# Patient Record
Sex: FEMALE | Race: WHITE | NOT HISPANIC OR LATINO | Employment: FULL TIME | URBAN - METROPOLITAN AREA
[De-identification: names, ages, dates, MRNs, and addresses within clinical notes are randomized per-mention and may not be internally consistent; named-entity substitution may affect disease eponyms.]

---

## 2017-08-04 ENCOUNTER — OFFICE VISIT (OUTPATIENT)
Dept: PODIATRY | Facility: CLINIC | Age: 52
End: 2017-08-04
Payer: COMMERCIAL

## 2017-08-04 ENCOUNTER — HOSPITAL ENCOUNTER (OUTPATIENT)
Dept: RADIOLOGY | Facility: HOSPITAL | Age: 52
Discharge: HOME | End: 2017-08-04
Attending: PODIATRIST
Payer: COMMERCIAL

## 2017-08-04 VITALS — BODY MASS INDEX: 50.02 KG/M2 | HEIGHT: 64 IN | WEIGHT: 293 LBS

## 2017-08-04 DIAGNOSIS — M21.6X1 ACQUIRED EQUINUS DEFORMITY OF BOTH FEET: ICD-10-CM

## 2017-08-04 DIAGNOSIS — M79.672 CHRONIC HEEL PAIN, LEFT: ICD-10-CM

## 2017-08-04 DIAGNOSIS — G89.29 CHRONIC HEEL PAIN, LEFT: ICD-10-CM

## 2017-08-04 DIAGNOSIS — M77.9 ENTHESOPATHY: Primary | ICD-10-CM

## 2017-08-04 DIAGNOSIS — M77.9 ENTHESOPATHY: ICD-10-CM

## 2017-08-04 DIAGNOSIS — M21.6X2 ACQUIRED EQUINUS DEFORMITY OF BOTH FEET: ICD-10-CM

## 2017-08-04 PROCEDURE — 99999 PR PBB SHADOW E&M-EST. PATIENT-LVL III: CPT | Mod: PBBFAC,,, | Performed by: PODIATRIST

## 2017-08-04 PROCEDURE — 99203 OFFICE O/P NEW LOW 30 MIN: CPT | Mod: S$GLB,,, | Performed by: PODIATRIST

## 2017-08-04 PROCEDURE — 73650 X-RAY EXAM OF HEEL: CPT | Mod: TC,LT

## 2017-08-04 PROCEDURE — 73650 X-RAY EXAM OF HEEL: CPT | Mod: 26,LT,, | Performed by: RADIOLOGY

## 2017-08-04 PROCEDURE — 3008F BODY MASS INDEX DOCD: CPT | Mod: S$GLB,,, | Performed by: PODIATRIST

## 2017-08-04 RX ORDER — PANTOPRAZOLE SODIUM 40 MG/1
TABLET, DELAYED RELEASE ORAL
COMMUNITY
Start: 2017-07-14 | End: 2022-03-05

## 2017-08-04 RX ORDER — SERTRALINE HYDROCHLORIDE 50 MG/1
100 TABLET, FILM COATED ORAL DAILY
COMMUNITY

## 2017-08-04 RX ORDER — CLONAZEPAM 1 MG/1
1 TABLET ORAL 2 TIMES DAILY PRN
COMMUNITY

## 2017-08-04 RX ORDER — LIDOCAINE HYDROCHLORIDE 20 MG/ML
JELLY TOPICAL
Qty: 30 ML | Refills: 2 | Status: SHIPPED | OUTPATIENT
Start: 2017-08-04 | End: 2022-03-05

## 2017-08-04 NOTE — PROGRESS NOTES
Subjective:      Patient ID: Brittany Patricio is a 52 y.o. female.    Chief Complaint: Heel Pain (Left)    Sharp deep pain and bump back of left heel.  Gradual onset, worsening over past several weeks, aggravated by increased weight bearing, shoe gear, pressure.  No previous medical treatment.  OTC pain med not helping.  Denies trauma and surgery left foot.  Relates breaking left ankle twice before.    Review of Systems   Constitution: Negative for chills, diaphoresis, fever, malaise/fatigue and night sweats.   Cardiovascular: Negative for claudication, cyanosis, leg swelling and syncope.   Skin: Negative for color change, dry skin, nail changes, rash, suspicious lesions and unusual hair distribution.   Musculoskeletal: Negative for falls, joint pain, joint swelling, muscle cramps, muscle weakness and stiffness.   Gastrointestinal: Negative for constipation, diarrhea, nausea and vomiting.   Neurological: Negative for brief paralysis, disturbances in coordination, focal weakness, numbness, paresthesias, sensory change and tremors.           Objective:      Physical Exam   Constitutional: She appears well-developed and well-nourished. She is cooperative. No distress.   Cardiovascular:   Pulses:       Popliteal pulses are 2+ on the right side, and 2+ on the left side.        Dorsalis pedis pulses are 2+ on the right side, and 2+ on the left side.        Posterior tibial pulses are 2+ on the right side, and 2+ on the left side.   Capillary refill 3 seconds all toes/distal feet, all toes/both feet warm to touch.      Negative lymphadenopathy bilateral popliteal fossa and tarsal tunnel.      Negavie lower extremity edema bilateral.     Musculoskeletal:        Right ankle: Normal. She exhibits normal range of motion, no swelling, no ecchymosis, no deformity, no laceration and normal pulse. Achilles tendon normal. Achilles tendon exhibits no pain, no defect and normal Mendez's test results.   Pain to palpation posterior  heel left with visible and palpable hard prominence of bone.  No evidence of trauma or infection present today.    Ankle dorsiflexion decreased at <10 degrees bilateral with minimal increase with knee flexion bilateral.    Otherwise, Normal angle, base, station of gait. All ten toes without clubbing, cyanosis, or signs of ischemia.  No pain to palpation bilateral lower extremities.  Range of motion, stability, muscle strength, and muscle tone normal bilateral feet and legs.     Lymphadenopathy:   Negative lymphadenopathy bilateral popliteal fossa and tarsal tunnel.   Neurological: She is alert. She has normal strength. She displays no atrophy and no tremor. No sensory deficit. She exhibits normal muscle tone. She displays no seizure activity. Gait normal.   Reflex Scores:       Patellar reflexes are 2+ on the right side and 2+ on the left side.       Achilles reflexes are 2+ on the right side and 2+ on the left side.  Negative tinel sign to percussion sural, superficial peroneal, deep peroneal, saphenous, and posterior tibial nerves right and left ankles and feet.     Skin: Skin is warm, dry and intact. No abrasion, no bruising, no burn, no ecchymosis, no laceration, no lesion and no rash noted. She is not diaphoretic. No cyanosis or erythema. No pallor. Nails show no clubbing.     Skin is normal age and health appropriate color, turgor, texture, and temperature bilateral lower extremities without ulceration, hyperpigmentation, discoloration, masses nodules or cords palpated.  No ecchymosis, erythema, edema, or cardinal signs of infection bilateral lower extremities.               Assessment:       Encounter Diagnoses   Name Primary?    Enthesopathy Yes    Chronic heel pain, left     Acquired equinus deformity of both feet          Plan:       Brittany was seen today for heel pain.    Diagnoses and all orders for this visit:    Enthesopathy  -     Ambulatory consult to Physical Therapy  -     ORTHOTIC DEVICE  (DME)    Chronic heel pain, left  -     Ambulatory consult to Physical Therapy  -     ORTHOTIC DEVICE (DME)    Acquired equinus deformity of both feet  -     Ambulatory consult to Physical Therapy  -     ORTHOTIC DEVICE (DME)    Other orders  -     lidocaine HCL 2% (XYLOCAINE) 2 % jelly; Apply topically as needed. Apply topically once nightly to affected part left heel.      I counseled the patient on her conditions, their implications and medical management.        Patient will stretch the tendo achilles complex three times daily as demonstrated in the office.  Literature was dispensed illustrating proper stretching technique.    Patient will obtain over the counter arch supports and wear them in shoes whenever possible.  Athletic shoes intended for walking or running are usually best.    The patient was advised that NSAID-type medications have two very important potential side effects: gastrointestinal irritation including hemorrhage and renal injuries. She was asked to take the medication with food and to stop if she experiences any GI upset. I asked her to call for vomiting, abdominal pain or black/bloody stools. The patient expresses understanding of these issues and questions were answered.    Discussed conservative treatment with shoes of adequate dimensions, material, and style to alleviate symptoms and delay or prevent surgical intervention.    Rx lidocaine gel, heel lifts, PT.    Dispense fx boot left - ambulate with cane - wean to shoes with heel lifts when pain allows.              Return in about 1 month (around 9/4/2017).

## 2017-08-15 ENCOUNTER — CLINICAL SUPPORT (OUTPATIENT)
Dept: REHABILITATION | Facility: HOSPITAL | Age: 52
End: 2017-08-15
Attending: PODIATRIST
Payer: COMMERCIAL

## 2017-08-15 DIAGNOSIS — G89.29 CHRONIC PAIN OF LEFT ANKLE: Primary | ICD-10-CM

## 2017-08-15 DIAGNOSIS — R26.2 DIFFICULTY WALKING: ICD-10-CM

## 2017-08-15 DIAGNOSIS — M25.672 STIFFNESS OF LEFT ANKLE JOINT: ICD-10-CM

## 2017-08-15 DIAGNOSIS — M25.572 CHRONIC PAIN OF LEFT ANKLE: Primary | ICD-10-CM

## 2017-08-15 PROCEDURE — G8978 MOBILITY CURRENT STATUS: HCPCS | Mod: CK | Performed by: PHYSICAL THERAPIST

## 2017-08-15 PROCEDURE — G8979 MOBILITY GOAL STATUS: HCPCS | Mod: CJ | Performed by: PHYSICAL THERAPIST

## 2017-08-15 PROCEDURE — 97140 MANUAL THERAPY 1/> REGIONS: CPT | Performed by: PHYSICAL THERAPIST

## 2017-08-15 PROCEDURE — 97162 PT EVAL MOD COMPLEX 30 MIN: CPT | Performed by: PHYSICAL THERAPIST

## 2017-08-15 PROCEDURE — 97110 THERAPEUTIC EXERCISES: CPT | Performed by: PHYSICAL THERAPIST

## 2017-08-15 NOTE — PROGRESS NOTES
Name:Brittany Patricio  Physician:Orion Mcdermott DPM  Date of eval:8/15/2017  Orders:  Physical Therapy evaluate and treat  Clinic: [id number]  Diagnosis:  1. Chronic pain of left ankle     2. Stiffness of left ankle joint     3. Difficulty walking         Precautions: LBP previous R knee injury  Evaluation date: 8/15/2017  Visit # authorized: 1/83  Authorization period: 12-31-17  Plan of care expiration: 9-25-17  MD Follow up appt: 9-29-17    Subjective     Chief complaint: pain in back of L ankle    Onset of pain : after January and favoring R LE she began with increased pain in ankle   Mechanism of onset :  Having to use L LE as dominant leg now as with climbing stairs to get in house and some inside house  Pt broke her ankle in 1981 and she removed her cast and continued with some problems.  Pt broke ankle again in 2000 and was able to cast ankle again and healed ok.  Pt has had the heel spur on back of heel for several years and had pain and just lived with pain  Pt fell and fractured R patella in January 2017and had PT and still has some problems Pt also has a chronic back problem with nerve damage down R leg from LB  Aggravating factors: climbing stairs, walking and standing  Easing factors: sitting  Sleep is not disturbed.   Previous functional limitations includes:could walk, stand and climb stairs better than now  Current functional limitations: walking, standing and climbing stairs    Patients structured exercise routine: none  Exercise routine prior to onset: none    Occupation: Pt works as a   and job related duties include sitting at desk.    Allergies:    Review of patient's allergies indicates:   Allergen Reactions    Iodine and iodide containing products        Medical history: LBP with R radiculopathy,   Past Medical History:   Diagnosis Date    Hypertension        Medication:   Current Outpatient Prescriptions on File Prior to Visit   Medication Sig Dispense Refill     "benazepril-hydrochlorthiazide (LOTENSIN HCT) 10-12.5 mg Tab Take 1 tablet by mouth once daily.      buPROPion (WELLBUTRIN XL) 300 MG 24 hr tablet Take 300 mg by mouth once daily.      carisoprodol (SOMA) 350 MG tablet Take 350 mg by mouth 4 (four) times daily as needed.      cetirizine 10 mg chewable tablet Take 50 mg by mouth once daily.      clonazePAM (KLONOPIN) 1 MG tablet Take 1 mg by mouth 2 (two) times daily as needed for Anxiety.      gabapentin (NEURONTIN) 300 MG capsule Take 1 capsule (300 mg total) by mouth 3 (three) times daily. 30 capsule 0    lidocaine HCL 2% (XYLOCAINE) 2 % jelly Apply topically as needed. Apply topically once nightly to affected part left heel. 30 mL 2    meloxicam (MOBIC) 15 MG tablet Take 15 mg by mouth once daily.      pantoprazole (PROTONIX) 40 MG tablet       sertraline (ZOLOFT) 50 MG tablet Take 50 mg by mouth once daily.       No current facility-administered medications on file prior to visit.      Xray: There is DJD and spurs of the calcaneus.  No fracture dislocation bone destruction seen.    Pain level with 0 being the lowest and 10 being the highest presently: 0  Pain level with 0 being the lowest and 10 being the highest at worst: 8  Pain level with 0 being the lowest and 10 being the highest at best: 0     Patient Goals: "stretch out Achilles tendon and avoid surgery"    Objective     Postural examination in standing:  - forward head  - forward shoulders  - weight shift to R    Postural examination in sitting:   - forward head  - forward shoulders  - LE positioned relaxed to 90      Functional assessment:   - walking/gait:ambulating with cane on R in [place]  - sit to stand: significant use of hands  - sit to supine: mod difficulty       - supine to sit: mod difficulty  - supine to prone: mod difficulty     Ankle Girth:no swelling noted    Ankle ROM: (measured in degrees)   Ankle RLE LLE   Dorsiflexion with knees straight 5 -5   Dorsiflexion with knees bent 5 0 "   Plantarflexion 45 45   Inversion 30 30   Eversion 20 20   Great toe flexion 45 45   Great toe extension 60 60     Knee  ROM: (measured in degrees)   Knee (R) (L)   Flexion 110 110   Extension 0 0     Flexibility testing:  - hamstrings:     90/90 test R 60 L 35             Muscle Strength  MMT R L   Hip flexion 3+/5 4-/5   Hip abduction 3+/5 3+/5   Hip extension 3+/5 3+/5   Toe flexors 4-/5 4-/5   Toe extensors 5/5 5/5   Knee extension 4+/5 5/5   Knee flexion 4/5 5/5   Ankle dorsiflexion 5/5 5/5   Ankle plantar flexion N/T 4-/5   Ankle inversion 5/5 4+/5   Ankle eversion 5/5 5/5     Endurance is poor    Palpation: no tenderness to palpation  Severe tightness GS/soleus muscle belly Large muscular calf    Joint mobility: ankle WNL    Sensation: Intact, but reports that prolonged standing leads to B foot numbness due to back problem    Outcome measures:    Impairment function:  Mobility  FOTO ankle disability index score: 51  PT reviewed FOTO scores for Brittany Patricio on 08/15/2017.   FOTO scores were entered into [facility] - see media section.    · G-code current: CK at least 40% but < 60% impaired, limited or restricted  · G-code goal: CJ at least 20%, but < 40% impaired, limited or restricted  Severity modifier selections based on the FOTO scoring, objective findings, and co-morbidity assessment    TREATMENT:  Therapeutic exercise: Brittany received therapeutic exercises to develop strength, stabilization and endurance; flexibility and range of motion for 10 minutes including:see HEP sheet.   GSS with strap x 10  Toe crunches x3 min  Toe abd/add x 20    Manual therapy: Brittany  received the following manual therapy techniques x 15 min. to include soft tissue and joint mobilization were applied to the: gastroc soleus to include: STM to L calf and Vacuum/cupping STM with manual therapy techniques was performed to L calf to decrease muscle tightness, increase circulation and promote healing process.  The pt's skin was  monitored for redness adjusting pressure as needed. The pt was instructed in possible side effects of bruising and/or soreness.        Pt. Education: Instructed pt. regarding:proper technique with all exercises. Pt. to demonstrate good understanding of the education provided. Brittany demonstrated good return demonstration of activities. No cultural, environmental, or spiritual barriers identified to treatment or learning.    Assessment   This is a 52 y.o. female referred to outpatient physical therapy and presents with a medical diagnosis of enthesopathy, chronic heel pain L acquired equinus deformity of B feet and PT diagnosis/findings of pain in L heel with loss of flexibility and ROM demonstrating limitations as described in the problem list. Patient was in agreement with set goals and plan of care. Pt was given a written HEP along with posture education, instruction on body mechanics, activity modification/avoidance, and core/LE strengthening regimen. Pt. verbally understood instructions and demonstrated proper form/technique. Pt was advised to perform these exercises free of pain, and discontinue use if symptoms persist/worsen. Pt will benefit from physical therapy services in order to maximize pain free functional independence. Rehab potential is good  History  Co-morbidities and personal factors that may impact the plan of care Examination  Body Structures and Functions, activity limitations and participation restrictions that may impact the plan of care Clinical Presentation   Decision Making/ Complexity Score   Co-morbidities: chronic LBP and previous R knee injury                Personal Factors:   obesity Body Regions: LE's    Body Systems: musculoskeletal, ROM and strength           Activity limitations: walking standing      Participation Restrictions:  Be able to exercise for Wellness         Evolving with changing characteristics   mod  FOTO 51         Medical necessity is demonstrated by the  following IMPAIRMENTS/PROBLEM LIST:  Decreased range of motion  Decreased strength  Increased pain with walking  Antalgic gait  Increased pain with prolonged standing  Increased pain and limited with climbing stairs  ADL and household activities lead to increased pain and are limited  Functional impairment rating of: 51,CK at least 40% but < 60% impaired, limited or restricted    GOALS:   Short Term Goals:  3 weeks  Increase range of motion 25%  Increase strength 1/2 muscle grade  Be able to perform HEP with minimal cueing required  Improve functional impairment of mobility to 55    Long Term Goals: 6 weeks  Increase range of motion to 75% to 100% full   Improve muscle strength 1 muscle grade  Improve muscle strength with MMT to 4+/5 to 5/5  Restore ability to ambulate with normal pain free gait  Walking for ADL and exercise will be restored without increased pain  Restore ability to stand for ADL without increased pain  Restore ability to climb stairs in a reciprocal manner with min to 0 increased pain and/or difficulty  Restore ability to perform ADL's and household activities independently and without increased pain  Improve functional impairment of mobility to CJ at least 20%, but < 40% impaired, limited or restricted    Plan     Pt will be treated by physical therapy 1-3 times a week for 6 weeks to include: Therapeutic exercises to increase ROM, strength and stabilization; joint and soft tissue mobilization with manual therapy techniques to decrease muscle tightness, pain and improve joint mobility; neuromuscular re-education to improve balance, coordination, kinesthetic sense and proprioception, therapeutic activities to improve coordination, strength and function, therapeutic taping to decrease pain, provide support and improve function of the LE(s); modalities such as moist heat, ice, ultrasound and electrical stimulation to increase circulation, decrease pain and inflammation; dry needling with manual  therapy techniques to decrease pain, inflammation and swelling, increase circulation and promote healing process will be considered and utilized as needed; temporary orthotics will be considered and utilized as needed to further decrease pain in WB; aquatic physical therapy will be utilized as needed.  Pt may be seen by PTA to carry out plan of care as part of the Rehab team.    I certify the need for these services furnished under this plan of treatment and while under my care.    ____________________________________ Physician/Referring Practitioner                                  Date of Signature

## 2017-08-15 NOTE — PLAN OF CARE
Name:Brittany Patricio  Physician:Orion Mcdermott DPM  Date of eval:8/15/2017  Orders:  Physical Therapy evaluate and treat  Clinic: [id number]  Diagnosis:  1. Chronic pain of left ankle     2. Stiffness of left ankle joint     3. Difficulty walking         Precautions: LBP previous R knee injury  Evaluation date: 8/15/2017  Visit # authorized: 1/83  Authorization period: 12-31-17  Plan of care expiration: 9-25-17  MD Follow up appt: 9-29-17    Subjective     Chief complaint: pain in back of L ankle    Onset of pain : after January and favoring R LE she began with increased pain in ankle   Mechanism of onset :  Having to use L LE as dominant leg now as with climbing stairs to get in house and some inside house  Pt broke her ankle in 1981 and she removed her cast and continued with some problems.  Pt broke ankle again in 2000 and was able to cast ankle again and healed ok.  Pt has had the heel spur on back of heel for several years and had pain and just lived with pain  Pt fell and fractured R patella in January 2017and had PT and still has some problems Pt also has a chronic back problem with nerve damage down R leg from LB  Aggravating factors: climbing stairs, walking and standing  Easing factors: sitting  Sleep is not disturbed.   Previous functional limitations includes:could walk, stand and climb stairs better than now  Current functional limitations: walking, standing and climbing stairs    Patients structured exercise routine: none  Exercise routine prior to onset: none    Occupation: Pt works as a   and job related duties include sitting at desk.    Allergies:    Review of patient's allergies indicates:   Allergen Reactions    Iodine and iodide containing products        Medical history: LBP with R radiculopathy,   Past Medical History:   Diagnosis Date    Hypertension        Medication:   Current Outpatient Prescriptions on File Prior to Visit   Medication Sig Dispense Refill     "benazepril-hydrochlorthiazide (LOTENSIN HCT) 10-12.5 mg Tab Take 1 tablet by mouth once daily.      buPROPion (WELLBUTRIN XL) 300 MG 24 hr tablet Take 300 mg by mouth once daily.      carisoprodol (SOMA) 350 MG tablet Take 350 mg by mouth 4 (four) times daily as needed.      cetirizine 10 mg chewable tablet Take 50 mg by mouth once daily.      clonazePAM (KLONOPIN) 1 MG tablet Take 1 mg by mouth 2 (two) times daily as needed for Anxiety.      gabapentin (NEURONTIN) 300 MG capsule Take 1 capsule (300 mg total) by mouth 3 (three) times daily. 30 capsule 0    lidocaine HCL 2% (XYLOCAINE) 2 % jelly Apply topically as needed. Apply topically once nightly to affected part left heel. 30 mL 2    meloxicam (MOBIC) 15 MG tablet Take 15 mg by mouth once daily.      pantoprazole (PROTONIX) 40 MG tablet       sertraline (ZOLOFT) 50 MG tablet Take 50 mg by mouth once daily.       No current facility-administered medications on file prior to visit.      Xray: There is DJD and spurs of the calcaneus.  No fracture dislocation bone destruction seen.    Pain level with 0 being the lowest and 10 being the highest presently: 0  Pain level with 0 being the lowest and 10 being the highest at worst: 8  Pain level with 0 being the lowest and 10 being the highest at best: 0     Patient Goals: "stretch out Achilles tendon and avoid surgery"    Objective     Postural examination in standing:  - forward head  - forward shoulders  - weight shift to R    Postural examination in sitting:   - forward head  - forward shoulders  - LE positioned relaxed to 90      Functional assessment:   - walking/gait:ambulating with cane on R in [place]  - sit to stand: significant use of hands  - sit to supine: mod difficulty       - supine to sit: mod difficulty  - supine to prone: mod difficulty     Ankle Girth:no swelling noted    Ankle ROM: (measured in degrees)   Ankle RLE LLE   Dorsiflexion with knees straight 5 -5   Dorsiflexion with knees bent 5 0 "   Plantarflexion 45 45   Inversion 30 30   Eversion 20 20   Great toe flexion 45 45   Great toe extension 60 60     Knee  ROM: (measured in degrees)   Knee (R) (L)   Flexion 110 110   Extension 0 0     Flexibility testing:  - hamstrings:     90/90 test R 60 L 35             Muscle Strength  MMT R L   Hip flexion 3+/5 4-/5   Hip abduction 3+/5 3+/5   Hip extension 3+/5 3+/5   Toe flexors 4-/5 4-/5   Toe extensors 5/5 5/5   Knee extension 4+/5 5/5   Knee flexion 4/5 5/5   Ankle dorsiflexion 5/5 5/5   Ankle plantar flexion N/T 4-/5   Ankle inversion 5/5 4+/5   Ankle eversion 5/5 5/5     Endurance is poor    Palpation: no tenderness to palpation  Severe tightness GS/soleus muscle belly Large muscular calf    Joint mobility: ankle WNL    Sensation: Intact, but reports that prolonged standing leads to B foot numbness due to back problem    Outcome measures:    Impairment function:  Mobility  FOTO ankle disability index score: 51  PT reviewed FOTO scores for Brittany Patricio on 08/15/2017.   FOTO scores were entered into [employer] - see media section.    · G-code current: CK at least 40% but < 60% impaired, limited or restricted  · G-code goal: CJ at least 20%, but < 40% impaired, limited or restricted  Severity modifier selections based on the FOTO scoring, objective findings, and co-morbidity assessment    TREATMENT:  Therapeutic exercise: Brittany received therapeutic exercises to develop strength, stabilization and endurance; flexibility and range of motion for 10 minutes including:see HEP sheet.   GSS with strap x 10  Toe crunches x3 min  Toe abd/add x 20    Manual therapy: Brittany  received the following manual therapy techniques x 15 min. to include soft tissue and joint mobilization were applied to the: gastroc soleus to include: STM to L calf and Vacuum/cupping STM with manual therapy techniques was performed to L calf to decrease muscle tightness, increase circulation and promote healing process.  The pt's skin was  monitored for redness adjusting pressure as needed. The pt was instructed in possible side effects of bruising and/or soreness.        Pt. Education: Instructed pt. regarding:proper technique with all exercises. Pt. to demonstrate good understanding of the education provided. Brittany demonstrated good return demonstration of activities. No cultural, environmental, or spiritual barriers identified to treatment or learning.    Assessment   This is a 52 y.o. female referred to outpatient physical therapy and presents with a medical diagnosis of enthesopathy, chronic heel pain L acquired equinus deformity of B feet and PT diagnosis/findings of pain in L heel with loss of flexibility and ROM demonstrating limitations as described in the problem list. Patient was in agreement with set goals and plan of care. Pt was given a written HEP along with posture education, instruction on body mechanics, activity modification/avoidance, and core/LE strengthening regimen. Pt. verbally understood instructions and demonstrated proper form/technique. Pt was advised to perform these exercises free of pain, and discontinue use if symptoms persist/worsen. Pt will benefit from physical therapy services in order to maximize pain free functional independence. Rehab potential is good  History  Co-morbidities and personal factors that may impact the plan of care Examination  Body Structures and Functions, activity limitations and participation restrictions that may impact the plan of care Clinical Presentation   Decision Making/ Complexity Score   Co-morbidities: chronic LBP and previous R knee injury                Personal Factors:   obesity Body Regions: LE's    Body Systems: musculoskeletal, ROM and strength           Activity limitations: walking standing      Participation Restrictions:  Be able to exercise for Wellness         Evolving with changing characteristics   mod  FOTO 51         Medical necessity is demonstrated by the  following IMPAIRMENTS/PROBLEM LIST:  Decreased range of motion  Decreased strength  Increased pain with walking  Antalgic gait  Increased pain with prolonged standing  Increased pain and limited with climbing stairs  ADL and household activities lead to increased pain and are limited  Functional impairment rating of: 51,CK at least 40% but < 60% impaired, limited or restricted    GOALS:   Short Term Goals:  3 weeks  Increase range of motion 25%  Increase strength 1/2 muscle grade  Be able to perform HEP with minimal cueing required  Improve functional impairment of mobility to 55    Long Term Goals: 6 weeks  Increase range of motion to 75% to 100% full   Improve muscle strength 1 muscle grade  Improve muscle strength with MMT to 4+/5 to 5/5  Restore ability to ambulate with normal pain free gait  Walking for ADL and exercise will be restored without increased pain  Restore ability to stand for ADL without increased pain  Restore ability to climb stairs in a reciprocal manner with min to 0 increased pain and/or difficulty  Restore ability to perform ADL's and household activities independently and without increased pain  Improve functional impairment of mobility to CJ at least 20%, but < 40% impaired, limited or restricted    Plan     Pt will be treated by physical therapy 1-3 times a week for 6 weeks to include: Therapeutic exercises to increase ROM, strength and stabilization; joint and soft tissue mobilization with manual therapy techniques to decrease muscle tightness, pain and improve joint mobility; neuromuscular re-education to improve balance, coordination, kinesthetic sense and proprioception, therapeutic activities to improve coordination, strength and function, therapeutic taping to decrease pain, provide support and improve function of the LE(s); modalities such as moist heat, ice, ultrasound and electrical stimulation to increase circulation, decrease pain and inflammation; dry needling with manual  therapy techniques to decrease pain, inflammation and swelling, increase circulation and promote healing process will be considered and utilized as needed; temporary orthotics will be considered and utilized as needed to further decrease pain in WB; aquatic physical therapy will be utilized as needed.  Pt may be seen by PTA to carry out plan of care as part of the Rehab team.    I certify the need for these services furnished under this plan of treatment and while under my care.    ____________________________________ Physician/Referring Practitioner                                  Date of Signature

## 2017-08-21 ENCOUNTER — CLINICAL SUPPORT (OUTPATIENT)
Dept: REHABILITATION | Facility: HOSPITAL | Age: 52
End: 2017-08-21
Attending: PODIATRIST
Payer: COMMERCIAL

## 2017-08-21 DIAGNOSIS — R26.2 DIFFICULTY WALKING: ICD-10-CM

## 2017-08-21 DIAGNOSIS — G89.29 CHRONIC PAIN OF LEFT ANKLE: Primary | ICD-10-CM

## 2017-08-21 DIAGNOSIS — M25.572 CHRONIC PAIN OF LEFT ANKLE: Primary | ICD-10-CM

## 2017-08-21 DIAGNOSIS — M25.672 STIFFNESS OF LEFT ANKLE JOINT: ICD-10-CM

## 2017-08-21 PROCEDURE — 97110 THERAPEUTIC EXERCISES: CPT

## 2017-08-21 PROCEDURE — 97140 MANUAL THERAPY 1/> REGIONS: CPT

## 2017-08-21 NOTE — PROGRESS NOTES
Name:Brittany Patricio  Physician:Orion Mcdermott DPM  Date of eval:8/21/2017  Orders:  Physical Therapy evaluate and treat  Clinic: [id number]  Diagnosis:  No diagnosis found.    Precautions: LBP previous R knee injury  Evaluation date: 8/21/2017  Visit # authorized: 2/83  Authorization period: 12-31-17  Plan of care expiration: 9-25-17  MD Follow up appt: 9-29-17    Subjective   Pt reports w/ 6/10 L ankle pn.     Allergies:    Review of patient's allergies indicates:   Allergen Reactions    Iodine and iodide containing products        Medical history: LBP with R radiculopathy,   Past Medical History:   Diagnosis Date    Hypertension        Medication:   Current Outpatient Prescriptions on File Prior to Visit   Medication Sig Dispense Refill    benazepril-hydrochlorthiazide (LOTENSIN HCT) 10-12.5 mg Tab Take 1 tablet by mouth once daily.      buPROPion (WELLBUTRIN XL) 300 MG 24 hr tablet Take 300 mg by mouth once daily.      carisoprodol (SOMA) 350 MG tablet Take 350 mg by mouth 4 (four) times daily as needed.      cetirizine 10 mg chewable tablet Take 50 mg by mouth once daily.      clonazePAM (KLONOPIN) 1 MG tablet Take 1 mg by mouth 2 (two) times daily as needed for Anxiety.      gabapentin (NEURONTIN) 300 MG capsule Take 1 capsule (300 mg total) by mouth 3 (three) times daily. 30 capsule 0    lidocaine HCL 2% (XYLOCAINE) 2 % jelly Apply topically as needed. Apply topically once nightly to affected part left heel. 30 mL 2    meloxicam (MOBIC) 15 MG tablet Take 15 mg by mouth once daily.      pantoprazole (PROTONIX) 40 MG tablet       sertraline (ZOLOFT) 50 MG tablet Take 50 mg by mouth once daily.       No current facility-administered medications on file prior to visit.      Xray: There is DJD and spurs of the calcaneus.  No fracture dislocation bone destruction seen.    Pain level with 0 being the lowest and 10 being the highest presently: 0  Pain level with 0 being the lowest and 10 being the highest  "at worst: 8  Pain level with 0 being the lowest and 10 being the highest at best: 0     Patient Goals: "stretch out Achilles tendon and avoid surgery"    Objective     Postural examination in standing:  - forward head  - forward shoulders  - weight shift to R    Postural examination in sitting:   - forward head  - forward shoulders  - LE positioned relaxed to 90      Outcome measures:    Impairment function:  Mobility  FOTO ankle disability index score: 51  PT reviewed FOTO scores for Brittany Patricio on 08/21/2017.   FOTO scores were entered into [employer] - see media section.    · G-code current: CK at least 40% but < 60% impaired, limited or restricted  · G-code goal: CJ at least 20%, but < 40% impaired, limited or restricted  Severity modifier selections based on the FOTO scoring, objective findings, and co-morbidity assessment    TREATMENT:  Therapeutic exercise: Brittany received therapeutic exercises to develop strength, stabilization and endurance; flexibility and range of motion for 50 minutes including:see HEP sheet.     GSS with strap x 10  Toe crunches 30 x 3 sec hold  Towel sweeps 2 min  [place]  x 2   Toe abd/add 30 x 3 sec hold  Ankle 4 way AROM 30 x ea      Manual therapy: Brittany  received the following manual therapy techniques x 8 min. to include soft tissue and joint mobilization were applied to the: gastroc soleus to include: STM to L calf and Vacuum/cupping STM with manual therapy techniques was performed to L calf to decrease muscle tightness, increase circulation and promote healing process.  The pt's skin was monitored for redness adjusting pressure as needed. The pt was instructed in possible side effects of bruising and/or soreness.        Pt. Education: Instructed pt. regarding:proper technique with all exercises. Pt. to demonstrate good understanding of the education provided. Brittany demonstrated good return demonstration of activities. No cultural, environmental, or spiritual barriers " identified to treatment or learning.    Assessment   Pt burak tx well w/ no increase in pn.  Pt displayed increased endurance during therex w/ VCs for technique.  Pt edu on and instructed to cont HEP.  Cont to progress as burak.    Pt will benefit from physical therapy services in order to maximize pain free functional independence. Rehab potential is good  History  Co-morbidities and personal factors that may impact the plan of care Examination  Body Structures and Functions, activity limitations and participation restrictions that may impact the plan of care Clinical Presentation   Decision Making/ Complexity Score   Co-morbidities: chronic LBP and previous R knee injury                Personal Factors:   obesity Body Regions: LE's    Body Systems: musculoskeletal, ROM and strength           Activity limitations: walking standing      Participation Restrictions:  Be able to exercise for Wellness         Evolving with changing characteristics   mod  FOTO 51         Medical necessity is demonstrated by the following IMPAIRMENTS/PROBLEM LIST:  Decreased range of motion  Decreased strength  Increased pain with walking  Antalgic gait  Increased pain with prolonged standing  Increased pain and limited with climbing stairs  ADL and household activities lead to increased pain and are limited  Functional impairment rating of: 51,CK at least 40% but < 60% impaired, limited or restricted    GOALS:   Short Term Goals:  3 weeks  Increase range of motion 25%  Increase strength 1/2 muscle grade  Be able to perform HEP with minimal cueing required  Improve functional impairment of mobility to 55    Long Term Goals: 6 weeks  Increase range of motion to 75% to 100% full   Improve muscle strength 1 muscle grade  Improve muscle strength with MMT to 4+/5 to 5/5  Restore ability to ambulate with normal pain free gait  Walking for ADL and exercise will be restored without increased pain  Restore ability to stand for ADL without increased  pain  Restore ability to climb stairs in a reciprocal manner with min to 0 increased pain and/or difficulty  Restore ability to perform ADL's and household activities independently and without increased pain  Improve functional impairment of mobility to CJ at least 20%, but < 40% impaired, limited or restricted    Plan     Pt will be treated by physical therapy 1-3 times a week for 6 weeks to include: Therapeutic exercises to increase ROM, strength and stabilization; joint and soft tissue mobilization with manual therapy techniques to decrease muscle tightness, pain and improve joint mobility; neuromuscular re-education to improve balance, coordination, kinesthetic sense and proprioception, therapeutic activities to improve coordination, strength and function, therapeutic taping to decrease pain, provide support and improve function of the LE(s); modalities such as moist heat, ice, ultrasound and electrical stimulation to increase circulation, decrease pain and inflammation; dry needling with manual therapy techniques to decrease pain, inflammation and swelling, increase circulation and promote healing process will be considered and utilized as needed; temporary orthotics will be considered and utilized as needed to further decrease pain in WB; aquatic physical therapy will be utilized as needed.  Pt may be seen by PTA to carry out plan of care as part of the Rehab team.

## 2017-08-23 ENCOUNTER — CLINICAL SUPPORT (OUTPATIENT)
Dept: REHABILITATION | Facility: HOSPITAL | Age: 52
End: 2017-08-23
Attending: PODIATRIST
Payer: COMMERCIAL

## 2017-08-23 DIAGNOSIS — G89.29 CHRONIC PAIN OF LEFT ANKLE: Primary | ICD-10-CM

## 2017-08-23 DIAGNOSIS — M25.572 CHRONIC PAIN OF LEFT ANKLE: Primary | ICD-10-CM

## 2017-08-23 PROCEDURE — 97110 THERAPEUTIC EXERCISES: CPT

## 2017-08-23 PROCEDURE — 97140 MANUAL THERAPY 1/> REGIONS: CPT

## 2017-08-23 NOTE — PROGRESS NOTES
Name:Brittany Patricio  Physician:Orion Mcdermott DPM  Date of eval:8/23/2017  Orders:  Physical Therapy evaluate and treat  Clinic: [id number]  Diagnosis:  1. Chronic pain of left ankle         Precautions: LBP previous R knee injury  Evaluation date: 8/23/2017  Visit # authorized: 3/83  Authorization period: 12-31-17  Plan of care expiration: 9-25-17  MD Follow up appt: 9-29-17    Subjective   Pt denies pn upon arrival.     Allergies:    Review of patient's allergies indicates:   Allergen Reactions    Iodine and iodide containing products        Medical history: LBP with R radiculopathy,   Past Medical History:   Diagnosis Date    Hypertension        Medication:   Current Outpatient Prescriptions on File Prior to Visit   Medication Sig Dispense Refill    benazepril-hydrochlorthiazide (LOTENSIN HCT) 10-12.5 mg Tab Take 1 tablet by mouth once daily.      buPROPion (WELLBUTRIN XL) 300 MG 24 hr tablet Take 300 mg by mouth once daily.      carisoprodol (SOMA) 350 MG tablet Take 350 mg by mouth 4 (four) times daily as needed.      cetirizine 10 mg chewable tablet Take 50 mg by mouth once daily.      clonazePAM (KLONOPIN) 1 MG tablet Take 1 mg by mouth 2 (two) times daily as needed for Anxiety.      gabapentin (NEURONTIN) 300 MG capsule Take 1 capsule (300 mg total) by mouth 3 (three) times daily. 30 capsule 0    lidocaine HCL 2% (XYLOCAINE) 2 % jelly Apply topically as needed. Apply topically once nightly to affected part left heel. 30 mL 2    meloxicam (MOBIC) 15 MG tablet Take 15 mg by mouth once daily.      pantoprazole (PROTONIX) 40 MG tablet       sertraline (ZOLOFT) 50 MG tablet Take 50 mg by mouth once daily.       No current facility-administered medications on file prior to visit.      Xray: There is DJD and spurs of the calcaneus.  No fracture dislocation bone destruction seen.    Pain level with 0 being the lowest and 10 being the highest presently: 0  Pain level with 0 being the lowest and 10 being  "the highest at worst: 8  Pain level with 0 being the lowest and 10 being the highest at best: 0     Patient Goals: "stretch out Achilles tendon and avoid surgery"    Objective    Pt arrived wearing boot on L foot  Postural examination in standing:  - forward head  - forward shoulders  - weight shift to R    Postural examination in sitting:   - forward head  - forward shoulders  - LE positioned relaxed to 90      Outcome measures:    Impairment function:  Mobility  FOTO ankle disability index score: 51  PT reviewed FOTO scores for Brittany Patricio on 08/23/2017.   FOTO scores were entered into [employer] - see media section.    · G-code current: CK at least 40% but < 60% impaired, limited or restricted  · G-code goal: CJ at least 20%, but < 40% impaired, limited or restricted  Severity modifier selections based on the FOTO scoring, objective findings, and co-morbidity assessment    TREATMENT:  Therapeutic exercise: Brittany  was instructed in and performed  therapeutic exercises to develop strength, stabilization and endurance; flexibility and range of motion for 50 minutes including:  stat bike x 10 min.   Soleus stretch incline 2 min  GSS incline 2 min  Toe crunches 30 x 3 sec hold  Towel sweeps IN/EV 2 min ea.  [place]  x 2   Toe abd/add 30 x 3 sec hold  Ankle 4 way AROM 30 x ea YTB      Manual therapy: Brittany  received the following manual therapy techniques x 8 min. to include soft tissue and joint mobilization were applied to the: gastroc soleus to include: STM to L calf and Vacuum/cupping STM with manual therapy techniques was performed to L calf to decrease muscle tightness, increase circulation and promote healing process.  The pt's skin was monitored for redness adjusting pressure as needed. The pt was instructed in possible side effects of bruising and/or soreness.        Pt. Education: Instructed pt. regarding:proper technique with all exercises. Pt. to demonstrate good understanding of the education " leila Brittany demonstrated good return demonstration of activities. No cultural, environmental, or spiritual barriers identified to treatment or learning.    Assessment   Pt burak tx  with progression of ther ex well w/ no increase in pn.  Pt displayed increased endurance during therex w/ VCs for technique.  Pt edu on and instructed to cont HEP.  Cont to progress as burak.    Pt will benefit from physical therapy services in order to maximize pain free functional independence. Rehab potential is good  History  Co-morbidities and personal factors that may impact the plan of care Examination  Body Structures and Functions, activity limitations and participation restrictions that may impact the plan of care Clinical Presentation   Decision Making/ Complexity Score   Co-morbidities: chronic LBP and previous R knee injury                Personal Factors:   obesity Body Regions: LE's    Body Systems: musculoskeletal, ROM and strength           Activity limitations: walking standing      Participation Restrictions:  Be able to exercise for Wellness         Evolving with changing characteristics   mod  FOTO 51         Medical necessity is demonstrated by the following IMPAIRMENTS/PROBLEM LIST:  Decreased range of motion  Decreased strength  Increased pain with walking  Antalgic gait  Increased pain with prolonged standing  Increased pain and limited with climbing stairs  ADL and household activities lead to increased pain and are limited  Functional impairment rating of: 51,CK at least 40% but < 60% impaired, limited or restricted    GOALS:   Short Term Goals:  3 weeks  Increase range of motion 25%  Increase strength 1/2 muscle grade  Be able to perform HEP with minimal cueing required  Improve functional impairment of mobility to 55    Long Term Goals: 6 weeks  Increase range of motion to 75% to 100% full   Improve muscle strength 1 muscle grade  Improve muscle strength with MMT to 4+/5 to 5/5  Restore ability to ambulate  with normal pain free gait  Walking for ADL and exercise will be restored without increased pain  Restore ability to stand for ADL without increased pain  Restore ability to climb stairs in a reciprocal manner with min to 0 increased pain and/or difficulty  Restore ability to perform ADL's and household activities independently and without increased pain  Improve functional impairment of mobility to CJ at least 20%, but < 40% impaired, limited or restricted    Plan     Pt will be treated by physical therapy 1-3 times a week for 6 weeks to include: Therapeutic exercises to increase ROM, strength and stabilization; joint and soft tissue mobilization with manual therapy techniques to decrease muscle tightness, pain and improve joint mobility; neuromuscular re-education to improve balance, coordination, kinesthetic sense and proprioception, therapeutic activities to improve coordination, strength and function, therapeutic taping to decrease pain, provide support and improve function of the LE(s); modalities such as moist heat, ice, ultrasound and electrical stimulation to increase circulation, decrease pain and inflammation; dry needling with manual therapy techniques to decrease pain, inflammation and swelling, increase circulation and promote healing process will be considered and utilized as needed; temporary orthotics will be considered and utilized as needed to further decrease pain in WB; aquatic physical therapy will be utilized as needed.  Pt may be seen by PTA to carry out plan of care as part of the Rehab team.

## 2017-08-31 ENCOUNTER — CLINICAL SUPPORT (OUTPATIENT)
Dept: REHABILITATION | Facility: HOSPITAL | Age: 52
End: 2017-08-31
Attending: PODIATRIST
Payer: COMMERCIAL

## 2017-08-31 DIAGNOSIS — R26.2 DIFFICULTY WALKING: ICD-10-CM

## 2017-08-31 DIAGNOSIS — G89.29 CHRONIC PAIN OF LEFT ANKLE: Primary | ICD-10-CM

## 2017-08-31 DIAGNOSIS — M25.672 STIFFNESS OF LEFT ANKLE JOINT: ICD-10-CM

## 2017-08-31 DIAGNOSIS — M25.572 CHRONIC PAIN OF LEFT ANKLE: Primary | ICD-10-CM

## 2017-08-31 PROCEDURE — 97110 THERAPEUTIC EXERCISES: CPT | Performed by: PHYSICAL THERAPIST

## 2017-08-31 PROCEDURE — 97140 MANUAL THERAPY 1/> REGIONS: CPT | Performed by: PHYSICAL THERAPIST

## 2017-08-31 NOTE — PROGRESS NOTES
Name:Brittany Patricio  Physician:Orion Mcdermott DPM  Date of eval:8/31/2017  Orders:  Physical Therapy evaluate and treat  Clinic: [id number]  Diagnosis:  1. Chronic pain of left ankle     2. Stiffness of left ankle joint     3. Difficulty walking         Precautions: LBP previous R knee injury  Evaluation date: 8/31/2017  Visit # authorized: 4/83  Authorization period: 12-31-17  Plan of care expiration: 9-25-17  MD Follow up appt: 9-29-17    Subjective   Pt states still hurts Pain level 2-3     Allergies:    Review of patient's allergies indicates:   Allergen Reactions    Iodine and iodide containing products        Medical history: LBP with R radiculopathy,   Past Medical History:   Diagnosis Date    Hypertension        Medication:   Current Outpatient Prescriptions on File Prior to Visit   Medication Sig Dispense Refill    benazepril-hydrochlorthiazide (LOTENSIN HCT) 10-12.5 mg Tab Take 1 tablet by mouth once daily.      buPROPion (WELLBUTRIN XL) 300 MG 24 hr tablet Take 300 mg by mouth once daily.      carisoprodol (SOMA) 350 MG tablet Take 350 mg by mouth 4 (four) times daily as needed.      cetirizine 10 mg chewable tablet Take 50 mg by mouth once daily.      clonazePAM (KLONOPIN) 1 MG tablet Take 1 mg by mouth 2 (two) times daily as needed for Anxiety.      gabapentin (NEURONTIN) 300 MG capsule Take 1 capsule (300 mg total) by mouth 3 (three) times daily. 30 capsule 0    lidocaine HCL 2% (XYLOCAINE) 2 % jelly Apply topically as needed. Apply topically once nightly to affected part left heel. 30 mL 2    meloxicam (MOBIC) 15 MG tablet Take 15 mg by mouth once daily.      pantoprazole (PROTONIX) 40 MG tablet       sertraline (ZOLOFT) 50 MG tablet Take 50 mg by mouth once daily.       No current facility-administered medications on file prior to visit.      Xray: There is DJD and spurs of the calcaneus.  No fracture dislocation bone destruction seen.    Pain level with 0 being the lowest and 10 being the  "highest presently: 0  Pain level with 0 being the lowest and 10 being the highest at worst: 8  Pain level with 0 being the lowest and 10 being the highest at best: 0     Patient Goals: "stretch out Achilles tendon and avoid surgery"    Objective    Pt arrived wearing boot on L foot    DF with knee extended and flexed prior to treatment 0    TREATMENT:  Therapeutic exercise: Brittany  was instructed in and performed  therapeutic exercises to develop strength, stabilization and endurance; flexibility and range of motion for 30 minutes including:  stat bike x 10 min.    Soleus stretch with strap x 10  GSS with strap x 10  Toe crunches 3 min  Towel sweeps IN/EV 2 min ea.  [place]  x 2   Toe abd/add 30 x 3 sec hold   Heel raises sitting  x 20   Baps board x 10 all planes   Ankle 4 way AROM 10 x ea OTB  Provided OTB for home use    Written ex provided for home use indented ex noted above      Manual therapy: Brittany  received the following manual therapy techniques x 25 min. to include soft tissue and joint mobilization were applied to the: gastroc soleus to include: STM to L calf and Pt received tool-assisted massage with manual therapy techniques to L gastroc soleus to trigger an inflammatory healing response and stimulate the production of new collagen and proper, more functional, less painful healing.   Vacuum/cupping STM with manual therapy techniques was performed to L calf to decrease muscle tightness, increase circulation and promote healing process.  The pt's skin was monitored for redness adjusting pressure as needed. The pt was instructed in possible side effects of bruising and/or soreness.    Kinesiotape was performed with 16" I strip to the Achilles region with 8" of I strip a Y to gastroc for pain relief and support.  Instructed pt in use, care and precautions with tape.      Pt refused ice stating she would ice at home as needed      Pt. Education: Instructed pt. regarding:proper technique with all " exercises. Pt. to demonstrate good understanding of the education provided. Brittany demonstrated good return demonstration of activities. No cultural, environmental, or spiritual barriers identified to treatment or learning.    Assessment   Pt with improved STMobility after treatment.  Alyssa progression well    Pt will benefit from physical therapy services in order to maximize pain free functional independence. Rehab potential is good  History  Co-morbidities and personal factors that may impact the plan of care Examination  Body Structures and Functions, activity limitations and participation restrictions that may impact the plan of care Clinical Presentation   Decision Making/ Complexity Score   Co-morbidities: chronic LBP and previous R knee injury                Personal Factors:   obesity Body Regions: LE's    Body Systems: musculoskeletal, ROM and strength           Activity limitations: walking standing      Participation Restrictions:  Be able to exercise for Wellness         Evolving with changing characteristics   mod  FOTO 51         Medical necessity is demonstrated by the following IMPAIRMENTS/PROBLEM LIST:  Decreased range of motion  Decreased strength  Increased pain with walking  Antalgic gait  Increased pain with prolonged standing  Increased pain and limited with climbing stairs  ADL and household activities lead to increased pain and are limited  Functional impairment rating of: 51,CK at least 40% but < 60% impaired, limited or restricted    GOALS:   Short Term Goals:  3 weeks  Increase range of motion 25%  Increase strength 1/2 muscle grade  Be able to perform HEP with minimal cueing required  Improve functional impairment of mobility to 55    Long Term Goals: 6 weeks  Increase range of motion to 75% to 100% full   Improve muscle strength 1 muscle grade  Improve muscle strength with MMT to 4+/5 to 5/5  Restore ability to ambulate with normal pain free gait  Walking for ADL and exercise will be  restored without increased pain  Restore ability to stand for ADL without increased pain  Restore ability to climb stairs in a reciprocal manner with min to 0 increased pain and/or difficulty  Restore ability to perform ADL's and household activities independently and without increased pain  Improve functional impairment of mobility to CJ at least 20%, but < 40% impaired, limited or restricted    Plan     Pt will be treated by physical therapy 1-3 times a week for 6 weeks to include: Therapeutic exercises to increase ROM, strength and stabilization; joint and soft tissue mobilization with manual therapy techniques to decrease muscle tightness, pain and improve joint mobility; neuromuscular re-education to improve balance, coordination, kinesthetic sense and proprioception, therapeutic activities to improve coordination, strength and function, therapeutic taping to decrease pain, provide support and improve function of the LE(s); modalities such as moist heat, ice, ultrasound and electrical stimulation to increase circulation, decrease pain and inflammation; dry needling with manual therapy techniques to decrease pain, inflammation and swelling, increase circulation and promote healing process will be considered and utilized as needed; temporary orthotics will be considered and utilized as needed to further decrease pain in WB; aquatic physical therapy will be utilized as needed.  Pt may be seen by PTA to carry out plan of care as part of the Rehab team.

## 2017-09-07 ENCOUNTER — CLINICAL SUPPORT (OUTPATIENT)
Dept: REHABILITATION | Facility: HOSPITAL | Age: 52
End: 2017-09-07
Attending: PODIATRIST
Payer: COMMERCIAL

## 2017-09-07 DIAGNOSIS — G89.29 CHRONIC PAIN OF LEFT ANKLE: Primary | ICD-10-CM

## 2017-09-07 DIAGNOSIS — M25.572 CHRONIC PAIN OF LEFT ANKLE: Primary | ICD-10-CM

## 2017-09-07 DIAGNOSIS — M25.672 STIFFNESS OF LEFT ANKLE JOINT: ICD-10-CM

## 2017-09-07 PROCEDURE — 97140 MANUAL THERAPY 1/> REGIONS: CPT

## 2017-09-07 PROCEDURE — 97110 THERAPEUTIC EXERCISES: CPT

## 2017-09-07 NOTE — PROGRESS NOTES
Name:Brittany Patricio  Physician:Orion Mcdermott DPM  Date of eval:9/7/2017  Orders:  Physical Therapy evaluate and treat  Clinic: [id number]  Diagnosis:  1. Chronic pain of left ankle     2. Stiffness of left ankle joint         Precautions: LBP previous R knee injury  Evaluation date: 9/7/2017  Visit # authorized: 5/83  Authorization period: 12-31-17  Plan of care expiration: 9-25-17  MD Follow up appt: 9-29-17    Subjective   Pt reports w/ 3/10 pn in L ankle.      Allergies:    Review of patient's allergies indicates:   Allergen Reactions    Iodine and iodide containing products        Medical history: LBP with R radiculopathy,   Past Medical History:   Diagnosis Date    Hypertension        Medication:   Current Outpatient Prescriptions on File Prior to Visit   Medication Sig Dispense Refill    benazepril-hydrochlorthiazide (LOTENSIN HCT) 10-12.5 mg Tab Take 1 tablet by mouth once daily.      buPROPion (WELLBUTRIN XL) 300 MG 24 hr tablet Take 300 mg by mouth once daily.      carisoprodol (SOMA) 350 MG tablet Take 350 mg by mouth 4 (four) times daily as needed.      cetirizine 10 mg chewable tablet Take 50 mg by mouth once daily.      clonazePAM (KLONOPIN) 1 MG tablet Take 1 mg by mouth 2 (two) times daily as needed for Anxiety.      gabapentin (NEURONTIN) 300 MG capsule Take 1 capsule (300 mg total) by mouth 3 (three) times daily. 30 capsule 0    lidocaine HCL 2% (XYLOCAINE) 2 % jelly Apply topically as needed. Apply topically once nightly to affected part left heel. 30 mL 2    meloxicam (MOBIC) 15 MG tablet Take 15 mg by mouth once daily.      pantoprazole (PROTONIX) 40 MG tablet       sertraline (ZOLOFT) 50 MG tablet Take 50 mg by mouth once daily.       No current facility-administered medications on file prior to visit.      Xray: There is DJD and spurs of the calcaneus.  No fracture dislocation bone destruction seen.    Pain level with 0 being the lowest and 10 being the highest presently: 0  Pain  "level with 0 being the lowest and 10 being the highest at worst: 8  Pain level with 0 being the lowest and 10 being the highest at best: 0     Patient Goals: "stretch out Achilles tendon and avoid surgery"    Objective    Pt arrived wearing boot on L foot    DF with knee extended and flexed prior to treatment 0    TREATMENT:  Therapeutic exercise: Brittany  was instructed in and performed  therapeutic exercises to develop strength, stabilization and endurance; flexibility and range of motion for  45 minutes including:    stat bike x 5 min.   Soleus stretch with strap x 10  GSS with strap x 10  Toe crunches 3 min  Towel sweeps IN/EV 2 min ea.  [place]  x 2   Toe abd/add 30 x 3 sec hold  Heel raises sitting  30 x   Baps board x 30 all planes  Ankle 4 way AROM 10 x ea OTB np  Provided OTB for home use    Written ex provided for home use indented ex noted above      Manual therapy:   STM to L calf 8 min    Pt. Education: Instructed pt. regarding:proper technique with all exercises. Pt. to demonstrate good understanding of the education provided. Brittany demonstrated good return demonstration of activities. No cultural, environmental, or spiritual barriers identified to treatment or learning.    Assessment   Pt burak tx well w/ no increase of pn.  Pt displayed increased muscular endurance during therex w/ VCs for technique.  Pt edu on and instructed to cont HEP.  Cont to progress as burak.      Pt will benefit from physical therapy services in order to maximize pain free functional independence. Rehab potential is good  History  Co-morbidities and personal factors that may impact the plan of care Examination  Body Structures and Functions, activity limitations and participation restrictions that may impact the plan of care Clinical Presentation   Decision Making/ Complexity Score   Co-morbidities: chronic LBP and previous R knee injury                Personal Factors:   obesity Body Regions: LE's    Body Systems: " musculoskeletal, ROM and strength           Activity limitations: walking standing      Participation Restrictions:  Be able to exercise for Wellness         Evolving with changing characteristics   mod  FOTO 51         Medical necessity is demonstrated by the following IMPAIRMENTS/PROBLEM LIST:  Decreased range of motion  Decreased strength  Increased pain with walking  Antalgic gait  Increased pain with prolonged standing  Increased pain and limited with climbing stairs  ADL and household activities lead to increased pain and are limited  Functional impairment rating of: 51,CK at least 40% but < 60% impaired, limited or restricted    GOALS:   Short Term Goals:  3 weeks  Increase range of motion 25%  Increase strength 1/2 muscle grade  Be able to perform HEP with minimal cueing required  Improve functional impairment of mobility to 55    Long Term Goals: 6 weeks  Increase range of motion to 75% to 100% full   Improve muscle strength 1 muscle grade  Improve muscle strength with MMT to 4+/5 to 5/5  Restore ability to ambulate with normal pain free gait  Walking for ADL and exercise will be restored without increased pain  Restore ability to stand for ADL without increased pain  Restore ability to climb stairs in a reciprocal manner with min to 0 increased pain and/or difficulty  Restore ability to perform ADL's and household activities independently and without increased pain  Improve functional impairment of mobility to CJ at least 20%, but < 40% impaired, limited or restricted    Plan     Pt will be treated by physical therapy 1-3 times a week for 6 weeks to include: Therapeutic exercises to increase ROM, strength and stabilization; joint and soft tissue mobilization with manual therapy techniques to decrease muscle tightness, pain and improve joint mobility; neuromuscular re-education to improve balance, coordination, kinesthetic sense and proprioception, therapeutic activities to improve coordination, strength  and function, therapeutic taping to decrease pain, provide support and improve function of the LE(s); modalities such as moist heat, ice, ultrasound and electrical stimulation to increase circulation, decrease pain and inflammation; dry needling with manual therapy techniques to decrease pain, inflammation and swelling, increase circulation and promote healing process will be considered and utilized as needed; temporary orthotics will be considered and utilized as needed to further decrease pain in WB; aquatic physical therapy will be utilized as needed.  Pt may be seen by PTA to carry out plan of care as part of the Rehab team.

## 2017-09-11 ENCOUNTER — CLINICAL SUPPORT (OUTPATIENT)
Dept: REHABILITATION | Facility: HOSPITAL | Age: 52
End: 2017-09-11
Attending: PODIATRIST
Payer: COMMERCIAL

## 2017-09-11 DIAGNOSIS — M25.672 STIFFNESS OF LEFT ANKLE JOINT: ICD-10-CM

## 2017-09-11 DIAGNOSIS — M25.572 CHRONIC PAIN OF LEFT ANKLE: Primary | ICD-10-CM

## 2017-09-11 DIAGNOSIS — G89.29 CHRONIC PAIN OF LEFT ANKLE: Primary | ICD-10-CM

## 2017-09-11 DIAGNOSIS — R26.2 DIFFICULTY WALKING: ICD-10-CM

## 2017-09-11 PROCEDURE — 97035 APP MDLTY 1+ULTRASOUND EA 15: CPT | Performed by: PHYSICAL THERAPIST

## 2017-09-11 PROCEDURE — 97110 THERAPEUTIC EXERCISES: CPT | Performed by: PHYSICAL THERAPIST

## 2017-09-11 PROCEDURE — 97140 MANUAL THERAPY 1/> REGIONS: CPT | Performed by: PHYSICAL THERAPIST

## 2017-09-11 NOTE — PROGRESS NOTES
Name:Brittany Patricio  Physician:Orion Mcdermott DPM  Date of eval:9/11/2017  Orders:  Physical Therapy evaluate and treat  Clinic: [id number]  Diagnosis:  1. Chronic pain of left ankle     2. Stiffness of left ankle joint     3. Difficulty walking         Precautions: LBP previous R knee injury  Evaluation date: 9/11/2017  Visit # authorized: 6/83  Authorization period: 12-31-17  Plan of care expiration: 9-25-17  MD Follow up appt: 9-29-17    Subjective   Pt reports w/ 5/10 pn in L ankle.  Pt states she did walk out of boot last weekend for went to a beach.  Pt states at times overall pain is 7-8.  Pt states she has been working on ex. Pt states she did nothing out of ordinary this past weekend.     Allergies:    Review of patient's allergies indicates:   Allergen Reactions    Iodine and iodide containing products        Medical history: LBP with R radiculopathy,   Past Medical History:   Diagnosis Date    Hypertension        Medication:   Current Outpatient Prescriptions on File Prior to Visit   Medication Sig Dispense Refill    benazepril-hydrochlorthiazide (LOTENSIN HCT) 10-12.5 mg Tab Take 1 tablet by mouth once daily.      buPROPion (WELLBUTRIN XL) 300 MG 24 hr tablet Take 300 mg by mouth once daily.      carisoprodol (SOMA) 350 MG tablet Take 350 mg by mouth 4 (four) times daily as needed.      cetirizine 10 mg chewable tablet Take 50 mg by mouth once daily.      clonazePAM (KLONOPIN) 1 MG tablet Take 1 mg by mouth 2 (two) times daily as needed for Anxiety.      gabapentin (NEURONTIN) 300 MG capsule Take 1 capsule (300 mg total) by mouth 3 (three) times daily. 30 capsule 0    lidocaine HCL 2% (XYLOCAINE) 2 % jelly Apply topically as needed. Apply topically once nightly to affected part left heel. 30 mL 2    meloxicam (MOBIC) 15 MG tablet Take 15 mg by mouth once daily.      pantoprazole (PROTONIX) 40 MG tablet       sertraline (ZOLOFT) 50 MG tablet Take 50 mg by mouth once daily.       No current  "facility-administered medications on file prior to visit.      Xray: There is DJD and spurs of the calcaneus.  No fracture dislocation bone destruction seen.    Pain level with 0 being the lowest and 10 being the highest presently: 0  Pain level with 0 being the lowest and 10 being the highest at worst: 8  Pain level with 0 being the lowest and 10 being the highest at best: 0     Patient Goals: "stretch out Achilles tendon and avoid surgery"    Objective    Pt arrived wearing boot on L foot  Swelling along Achilles insertion 5.0 cm to start after manual and US 4.5 cm  ST of calf getting a little more supple    DF with knee extended -5 and flexed 0 prior to treatment   After treatment DF -2    TREATMENT:  Therapeutic exercise: Brittany  was instructed in and performed  therapeutic exercises to develop strength, stabilization and endurance; flexibility and range of motion for 30  minutes including:    stat bike x 5 min.   Soleus stretch with strap x 10  GSS with strap x 10  Toe crunches 3 min  Towel sweeps IN/EV 2 min ea.  [place]  x 2   Toe abd/add 30 x 3 sec hold  Heel raises sitting  30 x   Baps board x 30 all planes  Ankle 4 way AROM 20 x ea OTB     Reiterated stretching gently 2-3 times a day      Manual therapy:   STM to L calf 30 min  Pt received tool-assisted massage with manual therapy techniques to L posterior calf to trigger an inflammatory healing response and stimulate the production of new collagen and proper, more functional, less painful healing. Vacuum/cupping STM with manual therapy techniques was performed to L posterior calf  to decrease muscle tightness, increase circulation and promote healing process.  The pt's skin was monitored for redness adjusting pressure as needed. The pt was instructed in possible side effects of bruising and/or soreness.    Kinesiotape was performed with 12" I strip to the Achilles region with 6" of I strip across Achilles insertion for pain relief and support.  " Instructed pt in use, care and precautions with tape.      Ultrasound  for pain control and to decrease inflammation @ continuous duty cycle, 1 Mhz, applied to R Achilles tendon region, intensity = 1.3 w/cm2 for 8 minutes.      Pt. Education: Instructed pt. regarding:proper technique with all exercises. Pt. to demonstrate good understanding of the education provided. Brittany demonstrated good return demonstration of activities. No cultural, environmental, or spiritual barriers identified to treatment or learning.    Assessment   ST mobility improving in calf though still needs increased calf flexibility so may benefit from some extra stretching, will monitor    Pt will benefit from physical therapy services in order to maximize pain free functional independence. Rehab potential is good      Medical necessity is demonstrated by the following IMPAIRMENTS/PROBLEM LIST:  Decreased range of motion  Decreased strength  Increased pain with walking  Antalgic gait  Increased pain with prolonged standing  Increased pain and limited with climbing stairs  ADL and household activities lead to increased pain and are limited  Functional impairment rating of: 51,CK at least 40% but < 60% impaired, limited or restricted    GOALS:   Short Term Goals:  3 weeks  Increase range of motion 25%  Increase strength 1/2 muscle grade  Be able to perform HEP with minimal cueing required  Improve functional impairment of mobility to 55    Long Term Goals: 6 weeks  Increase range of motion to 75% to 100% full   Improve muscle strength 1 muscle grade  Improve muscle strength with MMT to 4+/5 to 5/5  Restore ability to ambulate with normal pain free gait  Walking for ADL and exercise will be restored without increased pain  Restore ability to stand for ADL without increased pain  Restore ability to climb stairs in a reciprocal manner with min to 0 increased pain and/or difficulty  Restore ability to perform ADL's and household activities  independently and without increased pain  Improve functional impairment of mobility to CJ at least 20%, but < 40% impaired, limited or restricted    Plan     Pt will be treated by physical therapy 1-3 times a week for 6 weeks to include: Therapeutic exercises to increase ROM, strength and stabilization; joint and soft tissue mobilization with manual therapy techniques to decrease muscle tightness, pain and improve joint mobility; neuromuscular re-education to improve balance, coordination, kinesthetic sense and proprioception, therapeutic activities to improve coordination, strength and function, therapeutic taping to decrease pain, provide support and improve function of the LE(s); modalities such as moist heat, ice, ultrasound and electrical stimulation to increase circulation, decrease pain and inflammation; dry needling with manual therapy techniques to decrease pain, inflammation and swelling, increase circulation and promote healing process will be considered and utilized as needed; temporary orthotics will be considered and utilized as needed to further decrease pain in WB; aquatic physical therapy will be utilized as needed.  Pt may be seen by PTA to carry out plan of care as part of the Rehab team.

## 2017-09-18 ENCOUNTER — CLINICAL SUPPORT (OUTPATIENT)
Dept: REHABILITATION | Facility: HOSPITAL | Age: 52
End: 2017-09-18
Attending: PODIATRIST
Payer: COMMERCIAL

## 2017-09-18 DIAGNOSIS — R26.2 DIFFICULTY WALKING: ICD-10-CM

## 2017-09-18 DIAGNOSIS — M25.572 CHRONIC PAIN OF LEFT ANKLE: Primary | ICD-10-CM

## 2017-09-18 DIAGNOSIS — M25.672 STIFFNESS OF LEFT ANKLE JOINT: ICD-10-CM

## 2017-09-18 DIAGNOSIS — G89.29 CHRONIC PAIN OF LEFT ANKLE: Primary | ICD-10-CM

## 2017-09-18 PROCEDURE — 97035 APP MDLTY 1+ULTRASOUND EA 15: CPT | Performed by: PHYSICAL THERAPIST

## 2017-09-18 PROCEDURE — 97110 THERAPEUTIC EXERCISES: CPT | Performed by: PHYSICAL THERAPIST

## 2017-09-18 PROCEDURE — 97140 MANUAL THERAPY 1/> REGIONS: CPT | Performed by: PHYSICAL THERAPIST

## 2017-09-18 NOTE — PROGRESS NOTES
Name:Brittany Patricio  Physician:Orion Mcdermott DPM  Date of eval:9/18/2017  Orders:  Physical Therapy evaluate and treat  Clinic: [id number]  Diagnosis:  1. Chronic pain of left ankle     2. Stiffness of left ankle joint     3. Difficulty walking         Precautions: LBP previous R knee injury  Evaluation date: 9/18/2017  Visit # authorized: 7/83  Authorization period: 12-31-17  Plan of care expiration: 9-25-17  MD Follow up appt: 9-29-17    Subjective   Pt reports w/ 3/10 pn in L ankle.  Still with pain  Pt states she did not notice much difference with KT tape    Allergies:    Review of patient's allergies indicates:   Allergen Reactions    Iodine and iodide containing products        Medical history: LBP with R radiculopathy,   Past Medical History:   Diagnosis Date    Hypertension        Medication:   Current Outpatient Prescriptions on File Prior to Visit   Medication Sig Dispense Refill    benazepril-hydrochlorthiazide (LOTENSIN HCT) 10-12.5 mg Tab Take 1 tablet by mouth once daily.      buPROPion (WELLBUTRIN XL) 300 MG 24 hr tablet Take 300 mg by mouth once daily.      carisoprodol (SOMA) 350 MG tablet Take 350 mg by mouth 4 (four) times daily as needed.      cetirizine 10 mg chewable tablet Take 50 mg by mouth once daily.      clonazePAM (KLONOPIN) 1 MG tablet Take 1 mg by mouth 2 (two) times daily as needed for Anxiety.      gabapentin (NEURONTIN) 300 MG capsule Take 1 capsule (300 mg total) by mouth 3 (three) times daily. 30 capsule 0    lidocaine HCL 2% (XYLOCAINE) 2 % jelly Apply topically as needed. Apply topically once nightly to affected part left heel. 30 mL 2    meloxicam (MOBIC) 15 MG tablet Take 15 mg by mouth once daily.      pantoprazole (PROTONIX) 40 MG tablet       sertraline (ZOLOFT) 50 MG tablet Take 50 mg by mouth once daily.       No current facility-administered medications on file prior to visit.      Xray: There is DJD and spurs of the calcaneus.  No fracture dislocation bone  "destruction seen.    Pain level with 0 being the lowest and 10 being the highest presently: 0  Pain level with 0 being the lowest and 10 being the highest at worst: 8  Pain level with 0 being the lowest and 10 being the highest at best: 0     Patient Goals: "stretch out Achilles tendon and avoid surgery"    Objective    Pt arrived wearing boot on L foot  Swelling along Achilles insertion 5.0 cm to start after manual and US 4.5 cm  ST of calf getting a little more supple    DF with knee extended -2 and flexed 0      TREATMENT:  Therapeutic exercise: Brittany  was instructed in and performed  therapeutic exercises to develop strength, stabilization and endurance; flexibility and range of motion for 30  minutes including:    stat bike x 5 min.   Soleus stretch with strap x 10  GSS with strap x 10  Toe crunches 3 min  Towel sweeps IN/EV 20 min  [place]  x 2   Toe abd/add 30 x 3 sec hold  Heel raises sitting  10 x with 3#   Baps board x 10 all planes  Ankle 4 way AROM 20 x ea OTB     Reiterated stretching through out day, but not to overstretch      Manual therapy: Manual therapy techniques were performed to improve joint and soft tissue mobility, decrease muscle tightness and pain to L posterior calf for 15 minutes.        Pt received tool-assisted massage with manual therapy techniques to L posterior calf to trigger an inflammatory healing response and stimulate the production of new collagen and proper, more functional, less painful healing. Vacuum/cupping STM with manual therapy techniques was performed to L posterior calf  to decrease muscle tightness, increase circulation and promote healing process.  The pt's skin was monitored for redness adjusting pressure as needed. The pt was instructed in possible side effects of bruising and/or soreness.  Joint mob to foot and ankle  (NP)Kinesiotape was performed with 12" I strip to the Achilles region with 6" of I strip across Achilles insertion for pain relief and " support.  Instructed pt in use, care and precautions with tape.      Ultrasound  for pain control and to decrease inflammation @ continuous duty cycle, 1 Mhz, applied to R Achilles tendon region, intensity = 1.3 w/cm2 for 8 minutes.      Pt. Education: Instructed pt. regarding:proper technique with all exercises. Pt. to demonstrate good understanding of the education provided. Brittany demonstrated good return demonstration of activities. No cultural, environmental, or spiritual barriers identified to treatment or learning.    Assessment   Pt continues with some swelling and tightness and has been increasing stretching at home, but still tight and understands to continue stretching as burak and understands precautions not to overstretch     Pt will benefit from physical therapy services in order to maximize pain free functional independence. Rehab potential is good      Medical necessity is demonstrated by the following IMPAIRMENTS/PROBLEM LIST:  Decreased range of motion  Decreased strength  Increased pain with walking  Antalgic gait  Increased pain with prolonged standing  Increased pain and limited with climbing stairs  ADL and household activities lead to increased pain and are limited  Functional impairment rating of: 51,CK at least 40% but < 60% impaired, limited or restricted    GOALS:   Short Term Goals:  3 weeks  Increase range of motion 25%  Increase strength 1/2 muscle grade  Be able to perform HEP with minimal cueing required  Improve functional impairment of mobility to 55    Long Term Goals: 6 weeks  Increase range of motion to 75% to 100% full   Improve muscle strength 1 muscle grade  Improve muscle strength with MMT to 4+/5 to 5/5  Restore ability to ambulate with normal pain free gait  Walking for ADL and exercise will be restored without increased pain  Restore ability to stand for ADL without increased pain  Restore ability to climb stairs in a reciprocal manner with min to 0 increased pain and/or  difficulty  Restore ability to perform ADL's and household activities independently and without increased pain  Improve functional impairment of mobility to CJ at least 20%, but < 40% impaired, limited or restricted    Plan     Pt will be treated by physical therapy 1-3 times a week for 6 weeks to include: Therapeutic exercises to increase ROM, strength and stabilization; joint and soft tissue mobilization with manual therapy techniques to decrease muscle tightness, pain and improve joint mobility; neuromuscular re-education to improve balance, coordination, kinesthetic sense and proprioception, therapeutic activities to improve coordination, strength and function, therapeutic taping to decrease pain, provide support and improve function of the LE(s); modalities such as moist heat, ice, ultrasound and electrical stimulation to increase circulation, decrease pain and inflammation; dry needling with manual therapy techniques to decrease pain, inflammation and swelling, increase circulation and promote healing process will be considered and utilized as needed; temporary orthotics will be considered and utilized as needed to further decrease pain in WB; aquatic physical therapy will be utilized as needed.  Pt may be seen by PTA to carry out plan of care as part of the Rehab team.

## 2017-09-21 ENCOUNTER — CLINICAL SUPPORT (OUTPATIENT)
Dept: REHABILITATION | Facility: HOSPITAL | Age: 52
End: 2017-09-21
Attending: PODIATRIST
Payer: COMMERCIAL

## 2017-09-21 DIAGNOSIS — M25.672 STIFFNESS OF LEFT ANKLE JOINT: ICD-10-CM

## 2017-09-21 DIAGNOSIS — R26.2 DIFFICULTY WALKING: ICD-10-CM

## 2017-09-21 DIAGNOSIS — G89.29 CHRONIC PAIN OF LEFT ANKLE: Primary | ICD-10-CM

## 2017-09-21 DIAGNOSIS — M25.572 CHRONIC PAIN OF LEFT ANKLE: Primary | ICD-10-CM

## 2017-09-21 PROCEDURE — 97140 MANUAL THERAPY 1/> REGIONS: CPT

## 2017-09-21 PROCEDURE — 97110 THERAPEUTIC EXERCISES: CPT

## 2017-09-21 NOTE — PROGRESS NOTES
Name:Brittany Patricio  Physician:Orion Mcdermott DPM  Date of eval:9/21/2017  Orders:  Physical Therapy evaluate and treat  Clinic: [id number]  Diagnosis:  No diagnosis found.    Precautions: LBP previous R knee injury  Evaluation date: 9/21/2017  Visit # authorized: 8/83  Authorization period: 12-31-17  Plan of care expiration: 9-25-17  MD Follow up appt: 9-29-17    Subjective   Pt states feeling the same w/ 3/10 pn in L ankle.      Allergies:    Review of patient's allergies indicates:   Allergen Reactions    Iodine and iodide containing products        Medical history: LBP with R radiculopathy,   Past Medical History:   Diagnosis Date    Hypertension        Medication:   Current Outpatient Prescriptions on File Prior to Visit   Medication Sig Dispense Refill    benazepril-hydrochlorthiazide (LOTENSIN HCT) 10-12.5 mg Tab Take 1 tablet by mouth once daily.      buPROPion (WELLBUTRIN XL) 300 MG 24 hr tablet Take 300 mg by mouth once daily.      carisoprodol (SOMA) 350 MG tablet Take 350 mg by mouth 4 (four) times daily as needed.      cetirizine 10 mg chewable tablet Take 50 mg by mouth once daily.      clonazePAM (KLONOPIN) 1 MG tablet Take 1 mg by mouth 2 (two) times daily as needed for Anxiety.      gabapentin (NEURONTIN) 300 MG capsule Take 1 capsule (300 mg total) by mouth 3 (three) times daily. 30 capsule 0    lidocaine HCL 2% (XYLOCAINE) 2 % jelly Apply topically as needed. Apply topically once nightly to affected part left heel. 30 mL 2    meloxicam (MOBIC) 15 MG tablet Take 15 mg by mouth once daily.      pantoprazole (PROTONIX) 40 MG tablet       sertraline (ZOLOFT) 50 MG tablet Take 50 mg by mouth once daily.       No current facility-administered medications on file prior to visit.      Xray: There is DJD and spurs of the calcaneus.  No fracture dislocation bone destruction seen.    Pain level with 0 being the lowest and 10 being the highest presently: 0  Pain level with 0 being the lowest and 10  "being the highest at worst: 8  Pain level with 0 being the lowest and 10 being the highest at best: 0     Patient Goals: "stretch out Achilles tendon and avoid surgery"    Objective    Pt arrived wearing boot on L foot  Swelling along Achilles insertion 5.0 cm to start after manual and US 4.5 cm  ST of calf getting a little more supple    DF with knee extended -2 and flexed 0      TREATMENT:  Therapeutic exercise: Brittany  was instructed in and performed  therapeutic exercises to develop strength, stabilization and endurance; flexibility and range of motion for 20 minutes including:    stat bike x 5 min  Soleus stretch with strap x 10  GSS with strap x 10  Toe crunches 3 min  Towel sweeps IN/EV 20 min  [place]  x 2   Toe abd/add 30 x 3 sec hold  Heel raises sitting  10 x with 3#   Baps board 2 x 10 all planes  Ankle 4 way AROM 20 x ea OTB     Reiterated stretching through out day, but not to overstretch      Manual therapy:   STM L posterior calf 10 min    (NP)Kinesiotape was performed with 12" I strip to the Achilles region with 6" of I strip across Achilles insertion for pain relief and support.  Instructed pt in use, care and precautions with tape.  NP    Ultrasound  for pain control and to decrease inflammation @ continuous duty cycle, 1 Mhz, applied to R Achilles tendon region, intensity = 1.3 w/cm2 for 8 minutes.  NP      Pt. Education: Instructed pt. regarding:proper technique with all exercises. Pt. to demonstrate good understanding of the education provided. Brittany demonstrated good return demonstration of activities. No cultural, environmental, or spiritual barriers identified to treatment or learning.    Assessment   Pt burak tx well.  Pn level remained same prior to and after tx session.  Pt displayed increased endurance during therex w/ VcCs for technique.  Pt edu on and instructed to cont HEP.  Cont to progress as burak.      Pt will benefit from physical therapy services in order to maximize pain " free functional independence. Rehab potential is good      Medical necessity is demonstrated by the following IMPAIRMENTS/PROBLEM LIST:  Decreased range of motion  Decreased strength  Increased pain with walking  Antalgic gait  Increased pain with prolonged standing  Increased pain and limited with climbing stairs  ADL and household activities lead to increased pain and are limited  Functional impairment rating of: 51,CK at least 40% but < 60% impaired, limited or restricted    GOALS:   Short Term Goals:  3 weeks  Increase range of motion 25%  Increase strength 1/2 muscle grade  Be able to perform HEP with minimal cueing required  Improve functional impairment of mobility to 55    Long Term Goals: 6 weeks  Increase range of motion to 75% to 100% full   Improve muscle strength 1 muscle grade  Improve muscle strength with MMT to 4+/5 to 5/5  Restore ability to ambulate with normal pain free gait  Walking for ADL and exercise will be restored without increased pain  Restore ability to stand for ADL without increased pain  Restore ability to climb stairs in a reciprocal manner with min to 0 increased pain and/or difficulty  Restore ability to perform ADL's and household activities independently and without increased pain  Improve functional impairment of mobility to CJ at least 20%, but < 40% impaired, limited or restricted    Plan     Pt will be treated by physical therapy 1-3 times a week for 6 weeks to include: Therapeutic exercises to increase ROM, strength and stabilization; joint and soft tissue mobilization with manual therapy techniques to decrease muscle tightness, pain and improve joint mobility; neuromuscular re-education to improve balance, coordination, kinesthetic sense and proprioception, therapeutic activities to improve coordination, strength and function, therapeutic taping to decrease pain, provide support and improve function of the LE(s); modalities such as moist heat, ice, ultrasound and electrical  stimulation to increase circulation, decrease pain and inflammation; dry needling with manual therapy techniques to decrease pain, inflammation and swelling, increase circulation and promote healing process will be considered and utilized as needed; temporary orthotics will be considered and utilized as needed to further decrease pain in WB; aquatic physical therapy will be utilized as needed.  Pt may be seen by PTA to carry out plan of care as part of the Rehab team.

## 2017-09-25 ENCOUNTER — CLINICAL SUPPORT (OUTPATIENT)
Dept: REHABILITATION | Facility: HOSPITAL | Age: 52
End: 2017-09-25
Attending: PODIATRIST
Payer: COMMERCIAL

## 2017-09-25 DIAGNOSIS — M25.572 CHRONIC PAIN OF LEFT ANKLE: Primary | ICD-10-CM

## 2017-09-25 DIAGNOSIS — R26.2 DIFFICULTY WALKING: ICD-10-CM

## 2017-09-25 DIAGNOSIS — G89.29 CHRONIC PAIN OF LEFT ANKLE: Primary | ICD-10-CM

## 2017-09-25 DIAGNOSIS — M25.672 STIFFNESS OF LEFT ANKLE JOINT: ICD-10-CM

## 2017-09-25 PROCEDURE — 97110 THERAPEUTIC EXERCISES: CPT | Performed by: PHYSICAL THERAPIST

## 2017-09-25 PROCEDURE — 97140 MANUAL THERAPY 1/> REGIONS: CPT | Performed by: PHYSICAL THERAPIST

## 2017-09-25 NOTE — PLAN OF CARE
PHYSICAL THERAPY PROGRESS REPORT    Name:Brittany Patricio  Physician:Orion Mcdermott DPM  Date of eval:9/25/2017  Orders:  Physical Therapy evaluate and treat  Clinic: [id number]  Diagnosis:  1. Chronic pain of left ankle     2. Stiffness of left ankle joint     3. Difficulty walking         Precautions: LBP previous R knee injury  Evaluation date: 9/25/2017  Visit # authorized: 9/83  Authorization period: 12-31-17  Plan of care expiration: 9-25-17  MD Follow up appt: 9-29-17    Subjective   Pt states feeling the same w/ 3/10 pn in L ankle.  Pt reports she can have no pain, but pain can also still go up to 8/10 as worst    Allergies:    Review of patient's allergies indicates:   Allergen Reactions    Iodine and iodide containing products        Medical history: LBP with R radiculopathy,   Past Medical History:   Diagnosis Date    Hypertension        Medication:   Current Outpatient Prescriptions on File Prior to Visit   Medication Sig Dispense Refill    benazepril-hydrochlorthiazide (LOTENSIN HCT) 10-12.5 mg Tab Take 1 tablet by mouth once daily.      buPROPion (WELLBUTRIN XL) 300 MG 24 hr tablet Take 300 mg by mouth once daily.      carisoprodol (SOMA) 350 MG tablet Take 350 mg by mouth 4 (four) times daily as needed.      cetirizine 10 mg chewable tablet Take 50 mg by mouth once daily.      clonazePAM (KLONOPIN) 1 MG tablet Take 1 mg by mouth 2 (two) times daily as needed for Anxiety.      gabapentin (NEURONTIN) 300 MG capsule Take 1 capsule (300 mg total) by mouth 3 (three) times daily. 30 capsule 0    lidocaine HCL 2% (XYLOCAINE) 2 % jelly Apply topically as needed. Apply topically once nightly to affected part left heel. 30 mL 2    meloxicam (MOBIC) 15 MG tablet Take 15 mg by mouth once daily.      pantoprazole (PROTONIX) 40 MG tablet       sertraline (ZOLOFT) 50 MG tablet Take 50 mg by mouth once daily.       No current facility-administered medications on file prior to visit.          Objective    Pt  arrived wearing boot on L foot    Functional assessment:   - walking/gait:ambulating with cane on R in [place] at IE, now able to ambulate in [place] without cane  - sit to stand: significant use of hands  - sit to supine: mod difficulty       - supine to sit: mod difficulty  - supine to prone: mod difficulty      Ankle Girth:no swelling noted    Ankle ROM: (measured in degrees) 9-25-17  Ankle LLE   Dorsiflexion with knees straight 0   Dorsiflexion with knees bent 0   Plantarflexion 45   Inversion 30   Eversion 20   Great toe flexion 45   Great toe extension 60          Ankle ROM: (measured in degrees) INITIAL EVAL   Ankle RLE LLE   Dorsiflexion with knees straight 5 -5   Dorsiflexion with knees bent 5 0   Plantarflexion 45 45   Inversion 30 30   Eversion 20 20   Great toe flexion 45 45   Great toe extension 60 60         Flexibility testing:  - hamstrings:     90/90 test R 30 L 35 at IE R 60 L 35        Muscle Strength 9-25-17  MMT R L   Hip flexion 4-/5 4/5   Hip abduction 4/5 4-/5   Hip extension 3+/5 3+/5   Toe flexors N./T 4/5   Toe extensors  5/5 5/5   Knee extension 4+/5 5/5   Knee flexion 4/5 5/5   Ankle dorsiflexion 5/5 5/5   Ankle plantar flexion N/T 4/5   Ankle inversion 5/5 5-/5   Ankle eversion 5/5 5/5                 Muscle Strength INITIAL EVAL  MMT R L   Hip flexion 3+/5 4-/5   Hip abduction 3+/5 3+/5   Hip extension 3+/5 3+/5   Toe flexors 4-/5 4-/5   Toe extensors 5/5 5/5   Knee extension 4+/5 5/5   Knee flexion 4/5 5/5   Ankle dorsiflexion 5/5 5/5   Ankle plantar flexion N/T 4-/5   Ankle inversion 5/5 4+/5   Ankle eversion 5/5 5/5      Endurance is fair + at IE poor     Palpation: mod to severe tightness GS, severe at IE          TREATMENT:  Therapeutic exercise: Brittany  was instructed in and performed  therapeutic exercises to develop strength, stabilization and endurance; flexibility and range of motion for 40 minutes including:    stat bike x 5 min  Soleus stretch with strap x 10  GSS with  strap x 10  Toe crunches 3 min  Towel sweeps IN/EV 20   [place]  x 2   Toe abd/add 30 x 3 sec hold  Heel raises sitting  10 x with 4#   Baps board 2 x 10 all planes  Ankle 4 way AROM 20 x ea OTB     Reiterated stretching through out day, but not to overstretch    Manual therapy techniques were performed to improve joint and soft tissue mobility, decrease muscle tightness and pain to L posterior calf for 15 minutes.        Pt received tool-assisted massage with manual therapy techniques to L posterior calf to trigger an inflammatory healing response and stimulate the production of new collagen and proper, more functional, less painful healing. Vacuum/cupping STM with manual therapy techniques was performed to L posterior calf  to decrease muscle tightness, increase circulation and promote healing process.  The pt's skin was monitored for redness adjusting pressure as needed. The pt was instructed in possible side effects of bruising and/or soreness.  Joint mob to foot and ankle      Pt. Education: Instructed pt. regarding:proper technique with all exercises. Pt. to demonstrate good understanding of the education provided. Brittany demonstrated good return demonstration of activities. No cultural, environmental, or spiritual barriers identified to treatment or learning.    Assessment   Pt only shows slight improvement in calf flexibility and continues with pain. Pt's end feel with stretching is very hard.  Difficult to fully assess subtalar mobility of ankle due to pain with compression on Achilles tendon.  Pt's hip weakness may be related to chronic back problems.      Patient appears independent in the prescribed HEP and will continue.        Medical necessity is demonstrated by the following IMPAIRMENTS/PROBLEM LIST:  Decreased range of motion  Decreased strength  Increased pain with walking  Antalgic gait  Increased pain with prolonged standing  Increased pain and limited with climbing stairs  ADL and  household activities lead to increased pain and are limited  Functional impairment rating of: 51,CK at least 40% but < 60% impaired, limited or restricted    GOALS:   Short Term Goals:  3 weeks  Increase range of motion 25%  Increase strength 1/2 muscle grade  Be able to perform HEP with minimal cueing required  Improve functional impairment of mobility to 55    Long Term Goals: 6 weeks  Increase range of motion to 75% to 100% full   Improve muscle strength 1 muscle grade  Improve muscle strength with MMT to 4+/5 to 5/5  Restore ability to ambulate with normal pain free gait  Walking for ADL and exercise will be restored without increased pain  Restore ability to stand for ADL without increased pain  Restore ability to climb stairs in a reciprocal manner with min to 0 increased pain and/or difficulty  Restore ability to perform ADL's and household activities independently and without increased pain  Improve functional impairment of mobility to CJ at least 20%, but < 40% impaired, limited or restricted    Plan   Please advise of your findings and recommendations  Thank you for allowing us to assist in the care of your patient.      Sarah Bonilla, MS, PT

## 2017-09-25 NOTE — PROGRESS NOTES
PHYSICAL THERAPY PROGRESS REPORT    Name:Brittany Patricio  Physician:Orion Mcdermott DPM  Date of eval:9/25/2017  Orders:  Physical Therapy evaluate and treat  Clinic: [id number]  Diagnosis:  1. Chronic pain of left ankle     2. Stiffness of left ankle joint     3. Difficulty walking         Precautions: LBP previous R knee injury  Evaluation date: 9/25/2017  Visit # authorized: 9/83  Authorization period: 12-31-17  Plan of care expiration: 9-25-17  MD Follow up appt: 9-29-17    Subjective   Pt states feeling the same w/ 3/10 pn in L ankle.  Pt reports she can have no pain, but pain can also still go up to 8/10 as worst    Allergies:    Review of patient's allergies indicates:   Allergen Reactions    Iodine and iodide containing products        Medical history: LBP with R radiculopathy,   Past Medical History:   Diagnosis Date    Hypertension        Medication:   Current Outpatient Prescriptions on File Prior to Visit   Medication Sig Dispense Refill    benazepril-hydrochlorthiazide (LOTENSIN HCT) 10-12.5 mg Tab Take 1 tablet by mouth once daily.      buPROPion (WELLBUTRIN XL) 300 MG 24 hr tablet Take 300 mg by mouth once daily.      carisoprodol (SOMA) 350 MG tablet Take 350 mg by mouth 4 (four) times daily as needed.      cetirizine 10 mg chewable tablet Take 50 mg by mouth once daily.      clonazePAM (KLONOPIN) 1 MG tablet Take 1 mg by mouth 2 (two) times daily as needed for Anxiety.      gabapentin (NEURONTIN) 300 MG capsule Take 1 capsule (300 mg total) by mouth 3 (three) times daily. 30 capsule 0    lidocaine HCL 2% (XYLOCAINE) 2 % jelly Apply topically as needed. Apply topically once nightly to affected part left heel. 30 mL 2    meloxicam (MOBIC) 15 MG tablet Take 15 mg by mouth once daily.      pantoprazole (PROTONIX) 40 MG tablet       sertraline (ZOLOFT) 50 MG tablet Take 50 mg by mouth once daily.       No current facility-administered medications on file prior to visit.          Objective    Pt  arrived wearing boot on L foot    Functional assessment:   - walking/gait:ambulating with cane on R in [place] at IE, now able to ambulate in [place] without cane  - sit to stand: significant use of hands  - sit to supine: mod difficulty       - supine to sit: mod difficulty  - supine to prone: mod difficulty      Ankle Girth:no swelling noted    Ankle ROM: (measured in degrees) 9-25-17  Ankle LLE   Dorsiflexion with knees straight 0   Dorsiflexion with knees bent 0   Plantarflexion 45   Inversion 30   Eversion 20   Great toe flexion 45   Great toe extension 60          Ankle ROM: (measured in degrees) INITIAL EVAL   Ankle RLE LLE   Dorsiflexion with knees straight 5 -5   Dorsiflexion with knees bent 5 0   Plantarflexion 45 45   Inversion 30 30   Eversion 20 20   Great toe flexion 45 45   Great toe extension 60 60         Flexibility testing:  - hamstrings:     90/90 test R 30 L 35 at IE R 60 L 35        Muscle Strength 9-25-17  MMT R L   Hip flexion 4-/5 4/5   Hip abduction 4/5 4-/5   Hip extension 3+/5 3+/5   Toe flexors N./T 4/5   Toe extensors  5/5 5/5   Knee extension 4+/5 5/5   Knee flexion 4/5 5/5   Ankle dorsiflexion 5/5 5/5   Ankle plantar flexion N/T 4/5   Ankle inversion 5/5 5-/5   Ankle eversion 5/5 5/5                 Muscle Strength INITIAL EVAL  MMT R L   Hip flexion 3+/5 4-/5   Hip abduction 3+/5 3+/5   Hip extension 3+/5 3+/5   Toe flexors 4-/5 4-/5   Toe extensors 5/5 5/5   Knee extension 4+/5 5/5   Knee flexion 4/5 5/5   Ankle dorsiflexion 5/5 5/5   Ankle plantar flexion N/T 4-/5   Ankle inversion 5/5 4+/5   Ankle eversion 5/5 5/5      Endurance is fair + at IE poor     Palpation: mod to severe tightness GS, severe at IE          TREATMENT:  Therapeutic exercise: Brittany  was instructed in and performed  therapeutic exercises to develop strength, stabilization and endurance; flexibility and range of motion for 40 minutes including:    stat bike x 5 min  Soleus stretch with strap x 10  GSS with  strap x 10  Toe crunches 3 min  Towel sweeps IN/EV 20   [place]  x 2   Toe abd/add 30 x 3 sec hold  Heel raises sitting  10 x with 4#   Baps board 2 x 10 all planes  Ankle 4 way AROM 20 x ea OTB     Reiterated stretching through out day, but not to overstretch    Manual therapy techniques were performed to improve joint and soft tissue mobility, decrease muscle tightness and pain to L posterior calf for 15 minutes.        Pt received tool-assisted massage with manual therapy techniques to L posterior calf to trigger an inflammatory healing response and stimulate the production of new collagen and proper, more functional, less painful healing. Vacuum/cupping STM with manual therapy techniques was performed to L posterior calf  to decrease muscle tightness, increase circulation and promote healing process.  The pt's skin was monitored for redness adjusting pressure as needed. The pt was instructed in possible side effects of bruising and/or soreness.  Joint mob to foot and ankle      Pt. Education: Instructed pt. regarding:proper technique with all exercises. Pt. to demonstrate good understanding of the education provided. Brittany demonstrated good return demonstration of activities. No cultural, environmental, or spiritual barriers identified to treatment or learning.    Assessment   Pt only shows slight improvement in calf flexibility and continues with pain. Pt's end feel with stretching is very hard.  Difficult to fully assess subtalar mobility of ankle due to pain with compression on Achilles tendon.  Pt's hip weakness may be related to chronic back problems.      Patient appears independent in the prescribed HEP and will continue.        Medical necessity is demonstrated by the following IMPAIRMENTS/PROBLEM LIST:  Decreased range of motion  Decreased strength  Increased pain with walking  Antalgic gait  Increased pain with prolonged standing  Increased pain and limited with climbing stairs  ADL and  household activities lead to increased pain and are limited  Functional impairment rating of: 51,CK at least 40% but < 60% impaired, limited or restricted    GOALS:   Short Term Goals:  3 weeks  Increase range of motion 25%  Increase strength 1/2 muscle grade  Be able to perform HEP with minimal cueing required  Improve functional impairment of mobility to 55    Long Term Goals: 6 weeks  Increase range of motion to 75% to 100% full   Improve muscle strength 1 muscle grade  Improve muscle strength with MMT to 4+/5 to 5/5  Restore ability to ambulate with normal pain free gait  Walking for ADL and exercise will be restored without increased pain  Restore ability to stand for ADL without increased pain  Restore ability to climb stairs in a reciprocal manner with min to 0 increased pain and/or difficulty  Restore ability to perform ADL's and household activities independently and without increased pain  Improve functional impairment of mobility to CJ at least 20%, but < 40% impaired, limited or restricted    Plan   Please advise of your findings and recommendations  Thank you for allowing us to assist in the care of your patient.      Sarah Bonilla, MS, PT

## 2017-09-29 ENCOUNTER — OFFICE VISIT (OUTPATIENT)
Dept: PODIATRY | Facility: CLINIC | Age: 52
End: 2017-09-29
Payer: COMMERCIAL

## 2017-09-29 VITALS — BODY MASS INDEX: 50.02 KG/M2 | HEIGHT: 64 IN | WEIGHT: 293 LBS

## 2017-09-29 DIAGNOSIS — G89.29 CHRONIC HEEL PAIN, LEFT: ICD-10-CM

## 2017-09-29 DIAGNOSIS — M79.672 CHRONIC HEEL PAIN, LEFT: ICD-10-CM

## 2017-09-29 DIAGNOSIS — M77.9 ENTHESOPATHY: Primary | ICD-10-CM

## 2017-09-29 DIAGNOSIS — M21.6X2 ACQUIRED EQUINUS DEFORMITY OF BOTH FEET: ICD-10-CM

## 2017-09-29 DIAGNOSIS — M21.6X1 ACQUIRED EQUINUS DEFORMITY OF BOTH FEET: ICD-10-CM

## 2017-09-29 PROCEDURE — 3008F BODY MASS INDEX DOCD: CPT | Mod: S$GLB,,, | Performed by: PODIATRIST

## 2017-09-29 PROCEDURE — 99212 OFFICE O/P EST SF 10 MIN: CPT | Mod: S$GLB,,, | Performed by: PODIATRIST

## 2017-09-29 PROCEDURE — 99999 PR PBB SHADOW E&M-EST. PATIENT-LVL III: CPT | Mod: PBBFAC,,, | Performed by: PODIATRIST

## 2017-09-29 NOTE — PROGRESS NOTES
Subjective:      Patient ID: Brittany Patricio is a 52 y.o. female.    Chief Complaint: Heel Pain (Left)    Sharp deep pain and bump back of left heel.  Gradual onset, worsening over past several weeks, aggravated by increased weight bearing, shoe gear, pressure.  No previous medical treatment.  OTC pain med not helping.  Denies trauma and surgery left foot.  Relates breaking left ankle twice before.    No improvement with fx boot, stretches, lidocaine gel, PT.    Review of Systems   Constitution: Negative for chills, diaphoresis, fever, malaise/fatigue and night sweats.   Cardiovascular: Negative for claudication, cyanosis, leg swelling and syncope.   Skin: Negative for color change, dry skin, nail changes, rash, suspicious lesions and unusual hair distribution.   Musculoskeletal: Negative for falls, joint pain, joint swelling, muscle cramps, muscle weakness and stiffness.   Gastrointestinal: Negative for constipation, diarrhea, nausea and vomiting.   Neurological: Negative for brief paralysis, disturbances in coordination, focal weakness, numbness, paresthesias, sensory change and tremors.           Objective:      Physical Exam   Constitutional: She appears well-developed and well-nourished. She is cooperative. No distress.   Cardiovascular:   Pulses:       Popliteal pulses are 2+ on the right side, and 2+ on the left side.        Dorsalis pedis pulses are 2+ on the right side, and 2+ on the left side.        Posterior tibial pulses are 2+ on the right side, and 2+ on the left side.   Capillary refill 3 seconds all toes/distal feet, all toes/both feet warm to touch.      Negative lymphadenopathy bilateral popliteal fossa and tarsal tunnel.      Negavie lower extremity edema bilateral.     Musculoskeletal:        Right ankle: Normal. She exhibits normal range of motion, no swelling, no ecchymosis, no deformity, no laceration and normal pulse. Achilles tendon normal. Achilles tendon exhibits no pain, no defect and  normal Mendez's test results.   Pain to palpation posterior heel left with visible and palpable hard prominence of bone.  No evidence of trauma or infection present today.    Ankle dorsiflexion decreased at <10 degrees bilateral with no increase with knee flexion left, minimal right.    Otherwise, Normal angle, base, station of gait. All ten toes without clubbing, cyanosis, or signs of ischemia.  No pain to palpation bilateral lower extremities.  Range of motion, stability, muscle strength, and muscle tone normal bilateral feet and legs.     Lymphadenopathy:   Negative lymphadenopathy bilateral popliteal fossa and tarsal tunnel.   Neurological: She is alert. She has normal strength. She displays no atrophy and no tremor. No sensory deficit. She exhibits normal muscle tone. She displays no seizure activity. Gait normal.   Reflex Scores:       Patellar reflexes are 2+ on the right side and 2+ on the left side.       Achilles reflexes are 2+ on the right side and 2+ on the left side.  Negative tinel sign to percussion sural, superficial peroneal, deep peroneal, saphenous, and posterior tibial nerves right and left ankles and feet.     Skin: Skin is warm, dry and intact. No abrasion, no bruising, no burn, no ecchymosis, no laceration, no lesion and no rash noted. She is not diaphoretic. No cyanosis or erythema. No pallor. Nails show no clubbing.     Skin is normal age and health appropriate color, turgor, texture, and temperature bilateral lower extremities without ulceration, hyperpigmentation, discoloration, masses nodules or cords palpated.  No ecchymosis, erythema, edema, or cardinal signs of infection bilateral lower extremities.               Assessment:       Encounter Diagnoses   Name Primary?    Enthesopathy Yes    Chronic heel pain, left     Acquired equinus deformity of both feet          Plan:       Brittany was seen today for heel pain.    Diagnoses and all orders for this  visit:    Enthesopathy    Chronic heel pain, left    Acquired equinus deformity of both feet      I counseled the patient on her conditions, their implications and medical management.    Patient will stretch the tendo achilles complex three times daily as demonstrated in the office.  Literature was dispensed illustrating proper stretching technique.    Patient will obtain over the counter arch supports and wear them in shoes whenever possible.  Athletic shoes intended for walking or running are usually best.    The patient was advised that NSAID-type medications have two very important potential side effects: gastrointestinal irritation including hemorrhage and renal injuries. She was asked to take the medication with food and to stop if she experiences any GI upset. I asked her to call for vomiting, abdominal pain or black/bloody stools. The patient expresses understanding of these issues and questions were answered.    Discussed conservative treatment with shoes of adequate dimensions, material, and style to alleviate symptoms and delay or prevent surgical intervention.    Rx lidocaine gel, heel lifts, PT.    Continue fx boot left - ambulate with cane - wean to shoes with heel lifts when pain allows.    Counseled on conservative treatments including shoe and activity change, physical therapy, antiinflammatory, and pain medication, and sometimes injections for symptomatic relief.    Counseled on surgical correction SAMIR and retrocalcaneal exostectomy risks and benefits, including, but not limited to calcaneal gait/ulceration (sore on bottom of heel)  Poor/delayed wound healing, recurrence, infection, pain, scar, poor cosmesis, loss of function, nerve pain, nerve damage, numbness, syndromes (RSD), need for future surgical procedures, and or long term use of orthotics, blood clots of leg, lung, heart, brain, death.    Consider carefully, appoint with pcp for surgical clearance, follow here prn as symptoms dictate for  consent, post op Rx, and scheduling.            No Follow-up on file.

## 2017-10-23 ENCOUNTER — TELEPHONE (OUTPATIENT)
Dept: PODIATRY | Facility: CLINIC | Age: 52
End: 2017-10-23

## 2017-10-23 NOTE — TELEPHONE ENCOUNTER
----- Message from Anitha Mccurdy sent at 10/23/2017  1:55 PM CDT -----  Contact: self  Patient states she has her appointment with her primary doctor on 10/31/17. Please call patient at [phone]. Thanks!

## 2017-11-03 ENCOUNTER — OFFICE VISIT (OUTPATIENT)
Dept: PODIATRY | Facility: CLINIC | Age: 52
End: 2017-11-03
Payer: COMMERCIAL

## 2017-11-03 VITALS — BODY MASS INDEX: 50.02 KG/M2 | HEIGHT: 64 IN | WEIGHT: 293 LBS

## 2017-11-03 DIAGNOSIS — M77.32 HEEL SPUR, LEFT: ICD-10-CM

## 2017-11-03 DIAGNOSIS — M21.6X1 ACQUIRED EQUINUS DEFORMITY OF BOTH FEET: ICD-10-CM

## 2017-11-03 DIAGNOSIS — M79.672 CHRONIC HEEL PAIN, LEFT: ICD-10-CM

## 2017-11-03 DIAGNOSIS — M77.9 ENTHESOPATHY: Primary | ICD-10-CM

## 2017-11-03 DIAGNOSIS — M21.6X2 ACQUIRED EQUINUS DEFORMITY OF BOTH FEET: ICD-10-CM

## 2017-11-03 DIAGNOSIS — G89.29 CHRONIC HEEL PAIN, LEFT: ICD-10-CM

## 2017-11-03 PROCEDURE — 99999 PR PBB SHADOW E&M-EST. PATIENT-LVL III: CPT | Mod: PBBFAC,,, | Performed by: PODIATRIST

## 2017-11-03 PROCEDURE — 99212 OFFICE O/P EST SF 10 MIN: CPT | Mod: S$GLB,,, | Performed by: PODIATRIST

## 2017-11-03 RX ORDER — ALBUTEROL SULFATE 90 UG/1
AEROSOL, METERED RESPIRATORY (INHALATION)
COMMUNITY
Start: 2017-11-02

## 2017-11-03 RX ORDER — LIDOCAINE HYDROCHLORIDE 10 MG/ML
1 INJECTION, SOLUTION EPIDURAL; INFILTRATION; INTRACAUDAL; PERINEURAL ONCE
Status: CANCELLED | OUTPATIENT
Start: 2017-11-03 | End: 2017-11-03

## 2017-11-03 NOTE — PROGRESS NOTES
Subjective:      Patient ID: Brittany Patricio is a 52 y.o. female.    Chief Complaint: Foot Pain (Left)    Sharp deep pain and bump back of left heel.  Gradual onset, worsening over past several weeks, aggravated by increased weight bearing, shoe gear, pressure.  No previous medical treatment.  OTC pain med not helping.  Denies trauma and surgery left foot.  Relates breaking left ankle twice before.    No improvement with fx boot, stretches, lidocaine gel, PT.    Review of Systems   Constitution: Negative for chills, diaphoresis, fever, malaise/fatigue and night sweats.   Cardiovascular: Negative for claudication, cyanosis, leg swelling and syncope.   Skin: Negative for color change, dry skin, nail changes, rash, suspicious lesions and unusual hair distribution.   Musculoskeletal: Negative for falls, joint pain, joint swelling, muscle cramps, muscle weakness and stiffness.   Gastrointestinal: Negative for constipation, diarrhea, nausea and vomiting.   Neurological: Negative for brief paralysis, disturbances in coordination, focal weakness, numbness, paresthesias, sensory change and tremors.           Objective:      Physical Exam   Constitutional: She appears well-developed and well-nourished. She is cooperative. No distress.   Cardiovascular:   Pulses:       Popliteal pulses are 2+ on the right side, and 2+ on the left side.        Dorsalis pedis pulses are 2+ on the right side, and 2+ on the left side.        Posterior tibial pulses are 2+ on the right side, and 2+ on the left side.   Capillary refill 3 seconds all toes/distal feet, all toes/both feet warm to touch.      Negative lymphadenopathy bilateral popliteal fossa and tarsal tunnel.      Negavie lower extremity edema bilateral.     Musculoskeletal:        Right ankle: Normal. She exhibits normal range of motion, no swelling, no ecchymosis, no deformity, no laceration and normal pulse. Achilles tendon normal. Achilles tendon exhibits no pain, no defect and  normal Mendez's test results.   Pain to palpation posterior heel left with visible and palpable hard prominence of bone.  No evidence of trauma or infection present today.    Ankle dorsiflexion decreased at <10 degrees bilateral with no increase with knee flexion left, minimal right.    Otherwise, Normal angle, base, station of gait. All ten toes without clubbing, cyanosis, or signs of ischemia.  No pain to palpation bilateral lower extremities.  Range of motion, stability, muscle strength, and muscle tone normal bilateral feet and legs.     Lymphadenopathy:   Negative lymphadenopathy bilateral popliteal fossa and tarsal tunnel.   Neurological: She is alert. She has normal strength. She displays no atrophy and no tremor. No sensory deficit. She exhibits normal muscle tone. She displays no seizure activity. Gait normal.   Reflex Scores:       Patellar reflexes are 2+ on the right side and 2+ on the left side.       Achilles reflexes are 2+ on the right side and 2+ on the left side.  Negative tinel sign to percussion sural, superficial peroneal, deep peroneal, saphenous, and posterior tibial nerves right and left ankles and feet.     Skin: Skin is warm, dry and intact. No abrasion, no bruising, no burn, no ecchymosis, no laceration, no lesion and no rash noted. She is not diaphoretic. No cyanosis or erythema. No pallor. Nails show no clubbing.     Skin is normal age and health appropriate color, turgor, texture, and temperature bilateral lower extremities without ulceration, hyperpigmentation, discoloration, masses nodules or cords palpated.  No ecchymosis, erythema, edema, or cardinal signs of infection bilateral lower extremities.               Assessment:       Encounter Diagnoses   Name Primary?    Enthesopathy Yes    Chronic heel pain, left     Acquired equinus deformity of both feet     Heel spur, left          Plan:       Brittany was seen today for foot pain.    Diagnoses and all orders for this  visit:    Enthesopathy  -     ORTHOTIC DEVICE (DME)    Chronic heel pain, left  -     ORTHOTIC DEVICE (DME)    Acquired equinus deformity of both feet  -     ORTHOTIC DEVICE (DME)    Heel spur, left      I counseled the patient on her conditions, their implications and medical management.    Continue stretches, inserts, analgesic prn.    Continue fx boot left - ambulate with cane - wean to shoes with heel lifts when pain allows.    Counseled on conservative treatments including shoe and activity change, physical therapy, antiinflammatory, and pain medication, and sometimes injections for symptomatic relief.    Counseled on surgical correction SAMIR and retrocalcaneal exostectomy risks and benefits, including, but not limited to calcaneal gait/ulceration (sore on bottom of heel)  Poor/delayed wound healing, recurrence, infection, pain, scar, poor cosmesis, loss of function, nerve pain, nerve damage, numbness, syndromes (RSD), need for future surgical procedures, and or long term use of orthotics, blood clots of leg, lung, heart, brain, death.    Consider carefully, appoint with pcp for surgical clearance, follow here prn as symptoms dictate for consent, post op Rx, and scheduling.            Return in about 1 month (around 12/3/2017) for post op.

## 2017-11-07 ENCOUNTER — DOCUMENTATION ONLY (OUTPATIENT)
Dept: REHABILITATION | Facility: HOSPITAL | Age: 52
End: 2017-11-07

## 2017-11-07 DIAGNOSIS — R26.2 DIFFICULTY WALKING: ICD-10-CM

## 2017-11-07 DIAGNOSIS — G89.29 CHRONIC PAIN OF LEFT ANKLE: Primary | ICD-10-CM

## 2017-11-07 DIAGNOSIS — M25.672 STIFFNESS OF LEFT ANKLE JOINT: ICD-10-CM

## 2017-11-07 DIAGNOSIS — M25.572 CHRONIC PAIN OF LEFT ANKLE: Primary | ICD-10-CM

## 2017-11-07 NOTE — PROGRESS NOTES
PHYSICAL THERAPY DISCHARGE REPORT     Name:Brittany Patricio  Physician:Orion Mcdermott DPM  Date of eval:9/25/2017  Orders:  Physical Therapy evaluate and treat  Clinic: [id number]  Diagnosis:  1. Chronic pain of left ankle      2. Stiffness of left ankle joint      3. Difficulty walking            Precautions: LBP previous R knee injury  Evaluation date: 9/25/2017  Visit # authorized: 9/83  Authorization period: 12-31-17  Plan of care expiration: 9-25-17  MD Follow up appt: 9-29-17     Subjective   Pt states feeling the same w/ 3/10 pn in L ankle.  Pt reports she can have no pain, but pain can also still go up to 8/10 as worst     Allergies:         Review of patient's allergies indicates:   Allergen Reactions    Iodine and iodide containing products           Medical history: LBP with R radiculopathy,        Past Medical History:   Diagnosis Date    Hypertension           Medication:          Current Outpatient Prescriptions on File Prior to Visit   Medication Sig Dispense Refill    benazepril-hydrochlorthiazide (LOTENSIN HCT) 10-12.5 mg Tab Take 1 tablet by mouth once daily.        buPROPion (WELLBUTRIN XL) 300 MG 24 hr tablet Take 300 mg by mouth once daily.        carisoprodol (SOMA) 350 MG tablet Take 350 mg by mouth 4 (four) times daily as needed.        cetirizine 10 mg chewable tablet Take 50 mg by mouth once daily.        clonazePAM (KLONOPIN) 1 MG tablet Take 1 mg by mouth 2 (two) times daily as needed for Anxiety.        gabapentin (NEURONTIN) 300 MG capsule Take 1 capsule (300 mg total) by mouth 3 (three) times daily. 30 capsule 0    lidocaine HCL 2% (XYLOCAINE) 2 % jelly Apply topically as needed. Apply topically once nightly to affected part left heel. 30 mL 2    meloxicam (MOBIC) 15 MG tablet Take 15 mg by mouth once daily.        pantoprazole (PROTONIX) 40 MG tablet          sertraline (ZOLOFT) 50 MG tablet Take 50 mg by mouth once daily.          No current facility-administered medications  on file prior to visit.             Objective    Pt arrived wearing boot on L foot     Functional assessment:   - walking/gait:ambulating with cane on R in [place] at IE, now able to ambulate in [place] without cane  - sit to stand: significant use of hands  - sit to supine: mod difficulty       - supine to sit: mod difficulty  - supine to prone: mod difficulty      Ankle Girth:no swelling noted     Ankle ROM: (measured in degrees) 9-25-17  Ankle LLE   Dorsiflexion with knees straight 0   Dorsiflexion with knees bent 0   Plantarflexion 45   Inversion 30   Eversion 20   Great toe flexion 45   Great toe extension 60            Ankle ROM: (measured in degrees) INITIAL EVAL   Ankle RLE LLE   Dorsiflexion with knees straight 5 -5   Dorsiflexion with knees bent 5 0   Plantarflexion 45 45   Inversion 30 30   Eversion 20 20   Great toe flexion 45 45   Great toe extension 60 60         Flexibility testing:  - hamstrings:     90/90 test R 30 L 35 at IE R 60 L 35         Muscle Strength 9-25-17  MMT R L   Hip flexion 4-/5 4/5   Hip abduction 4/5 4-/5   Hip extension 3+/5 3+/5   Toe flexors N./T 4/5   Toe extensors  5/5 5/5   Knee extension 4+/5 5/5   Knee flexion 4/5 5/5   Ankle dorsiflexion 5/5 5/5   Ankle plantar flexion N/T 4/5   Ankle inversion 5/5 5-/5   Ankle eversion 5/5 5/5               Muscle Strength INITIAL EVAL  MMT R L   Hip flexion 3+/5 4-/5   Hip abduction 3+/5 3+/5   Hip extension 3+/5 3+/5   Toe flexors 4-/5 4-/5   Toe extensors 5/5 5/5   Knee extension 4+/5 5/5   Knee flexion 4/5 5/5   Ankle dorsiflexion 5/5 5/5   Ankle plantar flexion N/T 4-/5   Ankle inversion 5/5 4+/5   Ankle eversion 5/5 5/5      Endurance is fair + at IE poor     Palpation: mod to severe tightness GS, severe at IE            TREATMENT:  Therapeutic exercise: Brittany  was instructed in and performed  therapeutic exercises to develop strength, stabilization and endurance; flexibility and range of motion for 40 minutes including:     stat  bike x 5 min  Soleus stretch with strap x 10  GSS with strap x 10  Toe crunches 3 min  Towel sweeps IN/EV 20   Kimball  x 2   Toe abd/add 30 x 3 sec hold  Heel raises sitting  10 x with 4#   Baps board 2 x 10 all planes  Ankle 4 way AROM 20 x ea OTB      Reiterated stretching through out day, but not to overstretch     Manual therapy techniques were performed to improve joint and soft tissue mobility, decrease muscle tightness and pain to L posterior calf for 15 minutes.        Pt received tool-assisted massage with manual therapy techniques to L posterior calf to trigger an inflammatory healing response and stimulate the production of new collagen and proper, more functional, less painful healing. Vacuum/cupping STM with manual therapy techniques was performed to L posterior calf  to decrease muscle tightness, increase circulation and promote healing process.  The pt's skin was monitored for redness adjusting pressure as needed. The pt was instructed in possible side effects of bruising and/or soreness.  Joint mob to foot and ankle        Pt. Education: Instructed pt. regarding:proper technique with all exercises. Pt. to demonstrate good understanding of the education provided. Brittany demonstrated good return demonstration of activities. No cultural, environmental, or spiritual barriers identified to treatment or learning.     Assessment   Pt only shows slight improvement in calf flexibility and continues with pain. Pt's end feel with stretching is very hard.  Difficult to fully assess subtalar mobility of ankle due to pain with compression on Achilles tendon.  Pt's hip weakness may be related to chronic back problems.       Patient appears independent in the prescribed HEP and will continue.          Medical necessity is demonstrated by the following IMPAIRMENTS/PROBLEM LIST:  Decreased range of motion  Decreased strength  Increased pain with walking  Antalgic gait  Increased pain with prolonged  standing  Increased pain and limited with climbing stairs  ADL and household activities lead to increased pain and are limited  Functional impairment rating of: 51,CK at least 40% but < 60% impaired, limited or restricted     GOALS:   Short Term Goals:  3 weeks  Increase range of motion 25%  Increase strength 1/2 muscle grade  Be able to perform HEP with minimal cueing required  Improve functional impairment of mobility to 55     Long Term Goals: 6 weeks  Increase range of motion to 75% to 100% full   Improve muscle strength 1 muscle grade  Improve muscle strength with MMT to 4+/5 to 5/5  Restore ability to ambulate with normal pain free gait  Walking for ADL and exercise will be restored without increased pain  Restore ability to stand for ADL without increased pain  Restore ability to climb stairs in a reciprocal manner with min to 0 increased pain and/or difficulty  Restore ability to perform ADL's and household activities independently and without increased pain  Improve functional impairment of mobility to CJ at least 20%, but < 40% impaired, limited or restricted     Plan   Pt is discharged from PT to undergo surgery.  Thank you for allowing us to assist in the care of your patient.       Sarah Bonilla, MS, PT

## 2017-11-30 ENCOUNTER — ANESTHESIA EVENT (OUTPATIENT)
Dept: SURGERY | Facility: HOSPITAL | Age: 52
End: 2017-11-30
Payer: COMMERCIAL

## 2017-11-30 NOTE — PRE-PROCEDURE INSTRUCTIONS
PreOp Instructions given:     - Verbal medication information (what to hold and what to take)   - NPO guidelines   - Arrival place directions given;  - Bathing with antibacterial soap   - Don't wear any jewelry or bring any valuables AM of surgery   - No makeup or moisturizer to face   - No perfume/cologne, powder, lotions or aftershave     Pt. verbalized understanding.     Denies any family history of side effects or issues with anesthesia or sedation.

## 2017-12-01 ENCOUNTER — ANESTHESIA (OUTPATIENT)
Dept: SURGERY | Facility: HOSPITAL | Age: 52
End: 2017-12-01
Payer: COMMERCIAL

## 2017-12-01 ENCOUNTER — SURGERY (OUTPATIENT)
Age: 52
End: 2017-12-01

## 2017-12-01 ENCOUNTER — HOSPITAL ENCOUNTER (OUTPATIENT)
Facility: HOSPITAL | Age: 52
Discharge: HOME | End: 2017-12-01
Attending: PODIATRIST | Admitting: PODIATRIST
Payer: COMMERCIAL

## 2017-12-01 VITALS
HEART RATE: 109 BPM | BODY MASS INDEX: 50.02 KG/M2 | SYSTOLIC BLOOD PRESSURE: 118 MMHG | OXYGEN SATURATION: 92 % | RESPIRATION RATE: 12 BRPM | TEMPERATURE: 98 F | WEIGHT: 293 LBS | DIASTOLIC BLOOD PRESSURE: 70 MMHG | HEIGHT: 64 IN

## 2017-12-01 DIAGNOSIS — G89.29 CHRONIC HEEL PAIN, LEFT: Primary | ICD-10-CM

## 2017-12-01 DIAGNOSIS — M77.9 ENTHESOPATHY: ICD-10-CM

## 2017-12-01 DIAGNOSIS — M21.6X2 ACQUIRED EQUINUS DEFORMITY OF BOTH FEET: ICD-10-CM

## 2017-12-01 DIAGNOSIS — M79.672 CHRONIC HEEL PAIN, LEFT: Primary | ICD-10-CM

## 2017-12-01 DIAGNOSIS — M77.32 HEEL SPUR, LEFT: ICD-10-CM

## 2017-12-01 DIAGNOSIS — M21.6X1 ACQUIRED EQUINUS DEFORMITY OF BOTH FEET: ICD-10-CM

## 2017-12-01 PROCEDURE — 94761 N-INVAS EAR/PLS OXIMETRY MLT: CPT

## 2017-12-01 PROCEDURE — 28120 PART REMOVAL OF ANKLE/HEEL: CPT | Mod: LT,,, | Performed by: PODIATRIST

## 2017-12-01 PROCEDURE — 63600175 PHARM REV CODE 636 W HCPCS: Performed by: NURSE ANESTHETIST, CERTIFIED REGISTERED

## 2017-12-01 PROCEDURE — 25000003 PHARM REV CODE 250: Performed by: PODIATRIST

## 2017-12-01 PROCEDURE — D9220A PRA ANESTHESIA: Mod: CRNA,,, | Performed by: NURSE ANESTHETIST, CERTIFIED REGISTERED

## 2017-12-01 PROCEDURE — D9220A PRA ANESTHESIA: Mod: ANES,,, | Performed by: ANESTHESIOLOGY

## 2017-12-01 PROCEDURE — 36000708 HC OR TIME LEV III 1ST 15 MIN: Performed by: PODIATRIST

## 2017-12-01 PROCEDURE — 36000709 HC OR TIME LEV III EA ADD 15 MIN: Performed by: PODIATRIST

## 2017-12-01 PROCEDURE — 71000015 HC POSTOP RECOV 1ST HR: Performed by: PODIATRIST

## 2017-12-01 PROCEDURE — 25000003 PHARM REV CODE 250: Performed by: ANESTHESIOLOGY

## 2017-12-01 PROCEDURE — S0020 INJECTION, BUPIVICAINE HYDRO: HCPCS | Performed by: PODIATRIST

## 2017-12-01 PROCEDURE — C1713 ANCHOR/SCREW BN/BN,TIS/BN: HCPCS | Performed by: PODIATRIST

## 2017-12-01 PROCEDURE — 27201423 OPTIME MED/SURG SUP & DEVICES STERILE SUPPLY: Performed by: PODIATRIST

## 2017-12-01 PROCEDURE — 37000008 HC ANESTHESIA 1ST 15 MINUTES: Performed by: PODIATRIST

## 2017-12-01 PROCEDURE — 63600175 PHARM REV CODE 636 W HCPCS: Performed by: PODIATRIST

## 2017-12-01 PROCEDURE — 71000033 HC RECOVERY, INTIAL HOUR: Performed by: PODIATRIST

## 2017-12-01 PROCEDURE — 37000009 HC ANESTHESIA EA ADD 15 MINS: Performed by: PODIATRIST

## 2017-12-01 PROCEDURE — 71000039 HC RECOVERY, EACH ADD'L HOUR: Performed by: PODIATRIST

## 2017-12-01 PROCEDURE — 27000221 HC OXYGEN, UP TO 24 HOURS

## 2017-12-01 PROCEDURE — 25000242 PHARM REV CODE 250 ALT 637 W/ HCPCS: Performed by: NURSE ANESTHETIST, CERTIFIED REGISTERED

## 2017-12-01 PROCEDURE — 27685 REVISION OF LOWER LEG TENDON: CPT | Mod: 51,LT,, | Performed by: PODIATRIST

## 2017-12-01 DEVICE — ANCHOR SUT FT BIOCRKSCR 5.5MM: Type: IMPLANTABLE DEVICE | Site: FOOT | Status: FUNCTIONAL

## 2017-12-01 RX ORDER — HYDROMORPHONE HYDROCHLORIDE 2 MG/ML
INJECTION, SOLUTION INTRAMUSCULAR; INTRAVENOUS; SUBCUTANEOUS
Status: DISCONTINUED | OUTPATIENT
Start: 2017-12-01 | End: 2017-12-01

## 2017-12-01 RX ORDER — KETOROLAC TROMETHAMINE 30 MG/ML
INJECTION, SOLUTION INTRAMUSCULAR; INTRAVENOUS
Status: DISCONTINUED | OUTPATIENT
Start: 2017-12-01 | End: 2017-12-01

## 2017-12-01 RX ORDER — SODIUM CHLORIDE 9 MG/ML
INJECTION, SOLUTION INTRAVENOUS CONTINUOUS
Status: DISCONTINUED | OUTPATIENT
Start: 2017-12-01 | End: 2017-12-01 | Stop reason: HOSPADM

## 2017-12-01 RX ORDER — HYDROCODONE BITARTRATE AND ACETAMINOPHEN 5; 325 MG/1; MG/1
1 TABLET ORAL EVERY 4 HOURS PRN
Status: DISCONTINUED | OUTPATIENT
Start: 2017-12-01 | End: 2017-12-01 | Stop reason: HOSPADM

## 2017-12-01 RX ORDER — BUPIVACAINE HYDROCHLORIDE 5 MG/ML
INJECTION, SOLUTION EPIDURAL; INTRACAUDAL
Status: DISCONTINUED
Start: 2017-12-01 | End: 2017-12-01 | Stop reason: WASHOUT

## 2017-12-01 RX ORDER — LIDOCAINE HYDROCHLORIDE 10 MG/ML
1 INJECTION, SOLUTION EPIDURAL; INFILTRATION; INTRACAUDAL; PERINEURAL ONCE
Status: DISCONTINUED | OUTPATIENT
Start: 2017-12-01 | End: 2017-12-01 | Stop reason: HOSPADM

## 2017-12-01 RX ORDER — SODIUM CHLORIDE 0.9 % (FLUSH) 0.9 %
3 SYRINGE (ML) INJECTION
Status: DISCONTINUED | OUTPATIENT
Start: 2017-12-01 | End: 2017-12-01 | Stop reason: HOSPADM

## 2017-12-01 RX ORDER — BACITRACIN 500 [USP'U]/G
OINTMENT TOPICAL
Status: DISCONTINUED
Start: 2017-12-01 | End: 2017-12-01 | Stop reason: WASHOUT

## 2017-12-01 RX ORDER — ALBUTEROL SULFATE 90 UG/1
AEROSOL, METERED RESPIRATORY (INHALATION)
Status: DISCONTINUED | OUTPATIENT
Start: 2017-12-01 | End: 2017-12-01

## 2017-12-01 RX ORDER — MIDAZOLAM HYDROCHLORIDE 1 MG/ML
INJECTION, SOLUTION INTRAMUSCULAR; INTRAVENOUS
Status: DISCONTINUED | OUTPATIENT
Start: 2017-12-01 | End: 2017-12-01

## 2017-12-01 RX ORDER — LIDOCAINE HYDROCHLORIDE 20 MG/ML
INJECTION, SOLUTION INFILTRATION; PERINEURAL
Status: DISCONTINUED
Start: 2017-12-01 | End: 2017-12-01 | Stop reason: HOSPADM

## 2017-12-01 RX ORDER — BUPIVACAINE HYDROCHLORIDE 5 MG/ML
INJECTION, SOLUTION EPIDURAL; INTRACAUDAL
Status: DISCONTINUED | OUTPATIENT
Start: 2017-12-01 | End: 2017-12-01 | Stop reason: HOSPADM

## 2017-12-01 RX ORDER — LIDOCAINE HYDROCHLORIDE 10 MG/ML
INJECTION INFILTRATION; PERINEURAL
Status: DISCONTINUED | OUTPATIENT
Start: 2017-12-01 | End: 2017-12-01 | Stop reason: HOSPADM

## 2017-12-01 RX ORDER — LIDOCAINE HYDROCHLORIDE 10 MG/ML
INJECTION INFILTRATION; PERINEURAL
Status: DISCONTINUED
Start: 2017-12-01 | End: 2017-12-01 | Stop reason: HOSPADM

## 2017-12-01 RX ORDER — HYDROMORPHONE HYDROCHLORIDE 2 MG/ML
0.2 INJECTION, SOLUTION INTRAMUSCULAR; INTRAVENOUS; SUBCUTANEOUS EVERY 5 MIN PRN
Status: DISCONTINUED | OUTPATIENT
Start: 2017-12-01 | End: 2017-12-01

## 2017-12-01 RX ORDER — LIDOCAINE HCL/PF 100 MG/5ML
SYRINGE (ML) INTRAVENOUS
Status: DISCONTINUED | OUTPATIENT
Start: 2017-12-01 | End: 2017-12-01

## 2017-12-01 RX ORDER — OXYCODONE AND ACETAMINOPHEN 7.5; 325 MG/1; MG/1
1 TABLET ORAL EVERY 6 HOURS PRN
Qty: 40 TABLET | Refills: 0 | Status: SHIPPED | OUTPATIENT
Start: 2017-12-01 | End: 2018-09-24 | Stop reason: ALTCHOICE

## 2017-12-01 RX ORDER — BUPIVACAINE HYDROCHLORIDE 2.5 MG/ML
INJECTION, SOLUTION EPIDURAL; INFILTRATION; INTRACAUDAL
Status: DISCONTINUED
Start: 2017-12-01 | End: 2017-12-01 | Stop reason: WASHOUT

## 2017-12-01 RX ORDER — FENTANYL CITRATE 50 UG/ML
INJECTION, SOLUTION INTRAMUSCULAR; INTRAVENOUS
Status: DISCONTINUED | OUTPATIENT
Start: 2017-12-01 | End: 2017-12-01

## 2017-12-01 RX ORDER — BUPIVACAINE HYDROCHLORIDE 5 MG/ML
INJECTION, SOLUTION EPIDURAL; INTRACAUDAL
Status: DISCONTINUED
Start: 2017-12-01 | End: 2017-12-01 | Stop reason: HOSPADM

## 2017-12-01 RX ORDER — FENTANYL CITRATE 50 UG/ML
25 INJECTION, SOLUTION INTRAMUSCULAR; INTRAVENOUS EVERY 5 MIN PRN
Status: DISCONTINUED | OUTPATIENT
Start: 2017-12-01 | End: 2017-12-01 | Stop reason: HOSPADM

## 2017-12-01 RX ORDER — OXYCODONE AND ACETAMINOPHEN 7.5; 325 MG/1; MG/1
1 TABLET ORAL EVERY 6 HOURS PRN
Qty: 40 TABLET | Refills: 0 | Status: SHIPPED | OUTPATIENT
Start: 2017-12-01 | End: 2017-12-01

## 2017-12-01 RX ORDER — LIDOCAINE HYDROCHLORIDE 10 MG/ML
1 INJECTION, SOLUTION EPIDURAL; INFILTRATION; INTRACAUDAL; PERINEURAL ONCE
Status: COMPLETED | OUTPATIENT
Start: 2017-12-01 | End: 2017-12-01

## 2017-12-01 RX ORDER — PROPOFOL 10 MG/ML
VIAL (ML) INTRAVENOUS
Status: DISCONTINUED | OUTPATIENT
Start: 2017-12-01 | End: 2017-12-01

## 2017-12-01 RX ORDER — ONDANSETRON 2 MG/ML
INJECTION INTRAMUSCULAR; INTRAVENOUS
Status: DISCONTINUED | OUTPATIENT
Start: 2017-12-01 | End: 2017-12-01

## 2017-12-01 RX ORDER — IPRATROPIUM BROMIDE AND ALBUTEROL SULFATE 2.5; .5 MG/3ML; MG/3ML
3 SOLUTION RESPIRATORY (INHALATION) ONCE AS NEEDED
Status: DISCONTINUED | OUTPATIENT
Start: 2017-12-01 | End: 2017-12-01 | Stop reason: HOSPADM

## 2017-12-01 RX ORDER — SUCCINYLCHOLINE CHLORIDE 20 MG/ML
INJECTION INTRAMUSCULAR; INTRAVENOUS
Status: DISCONTINUED | OUTPATIENT
Start: 2017-12-01 | End: 2017-12-01

## 2017-12-01 RX ORDER — DEXAMETHASONE SODIUM PHOSPHATE 4 MG/ML
INJECTION, SOLUTION INTRA-ARTICULAR; INTRALESIONAL; INTRAMUSCULAR; INTRAVENOUS; SOFT TISSUE
Status: DISCONTINUED | OUTPATIENT
Start: 2017-12-01 | End: 2017-12-01

## 2017-12-01 RX ADMIN — DEXAMETHASONE SODIUM PHOSPHATE 4 MG: 4 INJECTION, SOLUTION INTRAMUSCULAR; INTRAVENOUS at 03:12

## 2017-12-01 RX ADMIN — MIDAZOLAM HYDROCHLORIDE 2 MG: 1 INJECTION, SOLUTION INTRAMUSCULAR; INTRAVENOUS at 12:12

## 2017-12-01 RX ADMIN — HYDROMORPHONE HYDROCHLORIDE 0.4 MG: 2 INJECTION INTRAMUSCULAR; INTRAVENOUS; SUBCUTANEOUS at 02:12

## 2017-12-01 RX ADMIN — HYDROCODONE BITARTRATE AND ACETAMINOPHEN 1 TABLET: 5; 325 TABLET ORAL at 04:12

## 2017-12-01 RX ADMIN — HYDROMORPHONE HYDROCHLORIDE 0.4 MG: 2 INJECTION INTRAMUSCULAR; INTRAVENOUS; SUBCUTANEOUS at 03:12

## 2017-12-01 RX ADMIN — DEXTROSE 2 G: 50 INJECTION, SOLUTION INTRAVENOUS at 12:12

## 2017-12-01 RX ADMIN — KETOROLAC TROMETHAMINE 30 MG: 30 INJECTION, SOLUTION INTRAMUSCULAR; INTRAVENOUS at 03:12

## 2017-12-01 RX ADMIN — ONDANSETRON 4 MG: 2 INJECTION INTRAMUSCULAR; INTRAVENOUS at 03:12

## 2017-12-01 RX ADMIN — LIDOCAINE HYDROCHLORIDE 10 MG: 10 INJECTION, SOLUTION EPIDURAL; INFILTRATION; INTRACAUDAL; PERINEURAL at 11:12

## 2017-12-01 RX ADMIN — PROPOFOL 200 MG: 10 INJECTION, EMULSION INTRAVENOUS at 12:12

## 2017-12-01 RX ADMIN — SODIUM CHLORIDE: 0.9 INJECTION, SOLUTION INTRAVENOUS at 11:12

## 2017-12-01 RX ADMIN — LIDOCAINE HYDROCHLORIDE 20 ML: 10 INJECTION, SOLUTION INFILTRATION; PERINEURAL at 12:12

## 2017-12-01 RX ADMIN — PROPOFOL 100 MG: 10 INJECTION, EMULSION INTRAVENOUS at 12:12

## 2017-12-01 RX ADMIN — FENTANYL CITRATE 100 MCG: 50 INJECTION, SOLUTION INTRAMUSCULAR; INTRAVENOUS at 12:12

## 2017-12-01 RX ADMIN — ALBUTEROL SULFATE 3 PUFF: 90 AEROSOL, METERED RESPIRATORY (INHALATION) at 12:12

## 2017-12-01 RX ADMIN — PROPOFOL 50 MG: 10 INJECTION, EMULSION INTRAVENOUS at 12:12

## 2017-12-01 RX ADMIN — SUCCINYLCHOLINE CHLORIDE 120 MG: 20 INJECTION, SOLUTION INTRAMUSCULAR; INTRAVENOUS at 12:12

## 2017-12-01 RX ADMIN — BUPIVACAINE HYDROCHLORIDE 9 ML: 5 INJECTION, SOLUTION EPIDURAL; INTRACAUDAL; PERINEURAL at 03:12

## 2017-12-01 RX ADMIN — LIDOCAINE HYDROCHLORIDE 100 MG: 20 INJECTION, SOLUTION INTRAVENOUS at 12:12

## 2017-12-01 NOTE — INTERVAL H&P NOTE
The patient has been examined and the H&P has been reviewed:    I concur with the findings and no changes have occurred since H&P was written.  Left heel marked as surgical site with agreement from doctor, nurse, patient, family.    Anesthesia/Surgery risks, benefits and alternative options discussed and understood by patient/family.          Active Hospital Problems    Diagnosis  POA    Enthesopathy [M77.9]  Yes      Resolved Hospital Problems    Diagnosis Date Resolved POA   No resolved problems to display.

## 2017-12-01 NOTE — OR NURSING
"Dr. Mcdermott notified of tourniquet at 2 hours. Dr Mcdermott stated to "add 15 minutes and he would be finished with it". -ANGELA Cai RN.   "

## 2017-12-01 NOTE — OP NOTE
Operative Note       Surgery Date: 12/1/2017     Surgeon(s) and Role:     * Orion Mcdermott DPM - Primary     * Emma Arzola MD - Resident - Assisting    Pre-op Diagnosis:  Enthesopathy [M77.9]  Heel spur, left [M77.32]  Chronic heel pain, left [M79.672, G89.29]  Acquired equinus deformity of both feet [M21.6X1, M21.6X2]    Post-op Diagnosis: Post-Op Diagnosis Codes:     * Enthesopathy [M77.9]     * Heel spur, left [M77.32]     * Chronic heel pain, left [M79.672, G89.29]     * Acquired equinus deformity of both feet [M21.6X1, M21.6X2]    Procedure(s) (LRB):  EXCISION-BONE SPUR-FOOT (Left)  LENGTHENING-TENDON-ACHILLES (SAMIR) - open (Left)    Anesthesia: General    Procedure in Detail/Findings:    The patient was brought to the operating room on a stretcher and placed on the operating table in a prone position. Following the successful induction of general anesthesia, a tourniquet was applied to the patients left thigh. Following this, a local anesthetic block consisting of aproximately 10cc of 1:1 mixture of 1% lidocaine plain + 0.5% marcaine plain was injected.  Then, the left leg was scrubbed, prepped and draped in the usual aseptic manner. A marking pen was utilized to create an incision guide in a linear fashion. A time out was performed and an esmark was used to exsanguinate the foot. The tourniquet was inflated to 320mmHg.        Attention was then directed towards the posterior incision where a #15 blade was used to make the skin incision down through the skin and subcutaneous tissues. All important neurovascular structures were avoided carefully. All bleeders were bovied as necessary. The incision was deepened down through the subcutaneous tissue to the level of the achilles tendon and its insertion and the flap of skin was reflected away from the achilles tendon. Once adequate exposure of the achilles tendon was noted, the #15 blade was used to resect the tendon away from its insertion on the posterior  calcaneous. A large posterior exostosis was noted to the posterior calcaneous . The tendon was reflected proximally to expose the entire exostosis. A sagittal saw and osteotome and mallet were used to resect adequately the entire exostosis. All remaining sharp edges were sharply removed using a #15 blade. A rasp was used to smooth out all edges and round off the posterior calcaneous. C arm used to confirm removal of posterior bony exostosis. Once adequate exostosis was removed and all areas were smoothed, the area was copiously flushed with saline. Two 5.5 arthrex cork screws were then placed in the posterior calcaneus using the Arthrex technique guide as well as using proper AO techniques. Once this was done the attached sutures were passed through the tendon, adequate reattachment of the tendon was noted. Any bulky overlapping tendon was debulked using the #15 blade.     Next procedure attention was directed to the posterior aspect of the left Leg. A linear incision was made on the skin overlying the Achilles tendon with a #15 blade. Care was taken to avoid all neurovascular structures. Next the Achilles tendon was isolated. A linear incision was made down the Achilles tendon in a Z type fashion. Next 2-0 ethibond was used to suture the Achilles tendon in the mid portions. Increase in Dorsiflexion noted.  All incisions were flushed with saline and re-approximated with 2-0 vicryl to Achilles tendon, 3-0 vicryl to deep layer layer and 4-0 vicryl to subcutaneous layer and skin with 3-0 nylon in a running suture technique.     The tourniquet was deflated and a prompt hyperemic response was noted to all toes. The skin was cleansed with wet and dry gauze. The incision was covered with adaptic and covered with sterile 4x4 gauze, ABD pad  was used with 4 total rolls of cast padding wrapped with 4 rolls of fiberglass. Cast bivalved then secured with ACE.     The patient tolerated the procedure and anesthesia well. She was  transferred to the recovery room with vital signs stable, vascular status intact, and capillary refill time < 3 seconds to the distal left foot. Following a period of post op monitoring, the patient will be discharged home on the following written and oral post op instructions:     1. Keep dressing dry and intact until clinic visit.  2. Complete non weightbearing to left leg.   3. Ice and elevate left foot when at rest.  4. All prescriptions were given to patient post op   5. Contact Dr. Mcdermott for all post op follow up care and if any problems arise.    JOSE FRANCISCO GomezM PGY-3    Estimated Blood Loss: < 10 ml            Specimens     None        Implants:   Implant Name Type Inv. Item Serial No.  Lot No. LRB No. Used   ANCHOR SUT FT BIOCRKSCR 5.5MM - [id number]  ANCHOR SUT FT BIOCRKSCR 5.5MM  [employer] [id number] Left 1   ANCHOR SUT FT BIOCRKSCR 5.5MM - [id number]   ANCHOR SUT FT BIOCRKSCR 5.5MM   [employer] [id number] Left 1              Disposition: PACU - hemodynamically stable.           Condition: Good    Attestation:  I was present and scrubbed for the entire procedure.           Discharge Note    Admit Date: 12/1/2017    Attending Physician: No att. providers found     Discharge Physician: No att. providers found    Final Diagnosis: Post-Op Diagnosis Codes:     * Enthesopathy [M77.9]     * Heel spur, left [M77.32]     * Chronic heel pain, left [M79.672, G89.29]     * Acquired equinus deformity of both feet [M21.6X1, M21.6X2]    Disposition: Home or Self Care    Patient Instructions:   Discharge Medication List as of 12/1/2017  6:54 PM      CONTINUE these medications which have CHANGED    Details   oxyCODONE-acetaminophen (PERCOCET) 7.5-325 mg per tablet Take 1 tablet by mouth every 6 (six) hours as needed for Pain., Starting Fri 12/1/2017, Print         CONTINUE these medications which have NOT CHANGED    Details   carisoprodol (SOMA) 350 MG tablet Take 350 mg by mouth 4 (four) times daily as needed., Until  Discontinued, Historical Med      clonazePAM (KLONOPIN) 1 MG tablet Take 1 mg by mouth 2 (two) times daily as needed for Anxiety., Historical Med      gabapentin (NEURONTIN) 300 MG capsule Take 1 capsule (300 mg total) by mouth 3 (three) times daily., Starting Tue 2/18/2014, Until Fri 12/1/2017, Print      pantoprazole (PROTONIX) 40 MG tablet Starting Fri 7/14/2017, Historical Med      PROAIR HFA 90 mcg/actuation inhaler Starting Thu 11/2/2017, Historical Med      sertraline (ZOLOFT) 50 MG tablet Take 50 mg by mouth once daily., Historical Med      benazepril-hydrochlorthiazide (LOTENSIN HCT) 10-12.5 mg Tab Take 1 tablet by mouth once daily., Until Discontinued, Historical Med      buPROPion (WELLBUTRIN XL) 300 MG 24 hr tablet Take 300 mg by mouth once daily., Until Discontinued, Historical Med      cetirizine 10 mg chewable tablet Take 50 mg by mouth once daily., Until Discontinued, Historical Med      lidocaine HCL 2% (XYLOCAINE) 2 % jelly Apply topically as needed. Apply topically once nightly to affected part left heel., Starting Fri 8/4/2017, Normal      meloxicam (MOBIC) 15 MG tablet Take 15 mg by mouth once daily., Until Discontinued, Historical Med             Discharge Procedure Orders (must include Diet, Follow-up, Activity)    Discharge Procedure Orders (must include Diet, Follow-up, Activity)  Diet general     Weight bearing restrictions (specify)   Order Comments: NWB left foot     Call MD for:  temperature >100.4     Call MD for:  persistent nausea and vomiting     Call MD for:  severe uncontrolled pain     Call MD for:  difficulty breathing, headache or visual disturbances     Call MD for:  redness, tenderness, or signs of infection (pain, swelling, redness, odor or green/yellow discharge around incision site)     Leave dressing on - Keep it clean, dry, and intact until clinic visit          Discharge Date: 12/1/2017  7:47 PM

## 2017-12-01 NOTE — ANESTHESIA PREPROCEDURE EVALUATION
[facility]  Anesthesia Pre-Operative Evaluation         Patient Name: Brittany Patricio  YOB: 1965  MRN: [id number]    SUBJECTIVE:     Pre-operative evaluation for Procedure(s) (LRB):  EXCISION-BONE SPUR-FOOT (Left)  LENGTHENING-TENDON-ACHILLES (SAMIR) (Left)     12/01/2017    Brittany Patricio is a 52 y.o. female     CC:  Enthesopathy; Left heel spur; Left chronic heel pain; Equinus deformity  Procedure:  Bone spur excision - left; achilles tendon lengthening - left       Patient now presents for the above procedure(s).      LDA:        Peripheral IV - Single Lumen 12/01/17 1103 Right Hand (Active)   Site Assessment Clean;Dry;Intact 12/1/2017 11:03 AM   Line Status Blood return noted;Infusing 12/1/2017 11:03 AM   Dressing Status Clean;Dry;Intact 12/1/2017 11:03 AM   Dressing Intervention New dressing 12/1/2017 11:03 AM   Number of days: 0       Prev airway: None documented.    Drips: None documented.   sodium chloride 0.9% 10 mL/hr at 12/01/17 1104       Patient Active Problem List   Diagnosis    Enthesopathy       Review of patient's allergies indicates:   Allergen Reactions    Iodine and iodide containing products Rash       Current Inpatient Medications:   bacitracin        bupivacaine (PF) 0.5% (5 mg/ml)        lidocaine (PF) 10 mg/ml (1%)  1 mL Intradermal Once    lidocaine HCL 10 mg/ml (1%)        lidocaine HCL 20 mg/ml (2%)           No current facility-administered medications on file prior to encounter.      Current Outpatient Prescriptions on File Prior to Encounter   Medication Sig Dispense Refill    carisoprodol (SOMA) 350 MG tablet Take 350 mg by mouth 4 (four) times daily as needed.      clonazePAM (KLONOPIN) 1 MG tablet Take 1 mg by mouth 2 (two) times daily as needed for Anxiety.      gabapentin (NEURONTIN) 300 MG capsule Take 1 capsule (300 mg total) by mouth 3 (three) times daily. 30 capsule 0    pantoprazole (PROTONIX) 40 MG tablet       PROAIR HFA 90  mcg/actuation inhaler       sertraline (ZOLOFT) 50 MG tablet Take 50 mg by mouth once daily.      benazepril-hydrochlorthiazide (LOTENSIN HCT) 10-12.5 mg Tab Take 1 tablet by mouth once daily.      buPROPion (WELLBUTRIN XL) 300 MG 24 hr tablet Take 300 mg by mouth once daily.      cetirizine 10 mg chewable tablet Take 50 mg by mouth once daily.      lidocaine HCL 2% (XYLOCAINE) 2 % jelly Apply topically as needed. Apply topically once nightly to affected part left heel. 30 mL 2    meloxicam (MOBIC) 15 MG tablet Take 15 mg by mouth once daily.         Past Surgical History:   Procedure Laterality Date     SECTION      HEMORRHOID SURGERY      REFRACTIVE SURGERY      TONSILLECTOMY, ADENOIDECTOMY         Social History     Social History    Marital status:      Spouse name: N/A    Number of children: N/A    Years of education: N/A     Occupational History    Not on file.     Social History Main Topics    Smoking status: Current Every Day Smoker     Packs/day: 0.50    Smokeless tobacco: Never Used    Alcohol use Yes      Comment: occassionally    Drug use: No    Sexual activity: Not on file     Other Topics Concern    Not on file     Social History Narrative    No narrative on file       OBJECTIVE:     Vital Signs Range (Last 24H):  Temp:  [36.8 °C (98.2 °F)]   Pulse:  [92]   Resp:  [18]   BP: (154)/(90)   SpO2:  [95 %]       CBC:   No results for input(s): WBC, RBC, HGB, HCT, PLT, MCV, MCH, MCHC in the last 72 hours.    CMP: No results for input(s): NA, K, CL, CO2, BUN, CREATININE, GLU, MG, PHOS, CALCIUM, ALBUMIN, PROT, ALKPHOS, ALT, AST, BILITOT in the last 72 hours.    INR:  No results for input(s): INR, PROTIME, APTT in the last 72 hours.    Invalid input(s): PT    Diagnostic Studies: No relevant studies.    EKG: No recent studies available.    2D ECHO:  No results found for this or any previous visit.      ASSESSMENT/PLAN:         Anesthesia Evaluation    I have reviewed the  Patient Summary Reports.    I have reviewed the Nursing Notes.   I have reviewed the Medications.     Review of Systems  Anesthesia Hx:  No problems with previous Anesthesia  History of prior surgery of interest to airway management or planning: Previous anesthesia: General Denies Family Hx of Anesthesia complications.   Denies Personal Hx of Anesthesia complications.   Social:  Non-Smoker, Former Smoker    Hematology/Oncology:  Hematology Normal   Oncology Normal     EENT/Dental:EENT/Dental Normal   Cardiovascular:   Exercise tolerance: good Hypertension    Pulmonary:   Asthma    Renal/:  Renal/ Normal     Hepatic/GI:  Hepatic/GI Normal    Musculoskeletal:   Arthritis   Spine Disorders:    Neurological:   Chronic Pain Syndrome   Endocrine:  Endocrine Normal    Dermatological:  Skin Normal    Psych:  Psychiatric Normal           Physical Exam  General:  Well nourished, Morbid Obesity    Airway/Jaw/Neck:  Airway Findings: Mouth Opening: Small, but > 3cm Tongue: Normal  General Airway Assessment: Adult, Average  Mallampati: III  Improves to II with phonation.  TM Distance: 4 - 6 cm  Jaw/Neck Findings:  Neck Findings:  Girth Increased     Eyes/Ears/Nose:  EYES/EARS/NOSE FINDINGS: Normal   Dental:  Dental Findings: In tact, upper front caps   Chest/Lungs:  Chest/Lungs Findings: Clear to auscultation, Normal Respiratory Rate, Expiratory Wheezes, Mild     Heart/Vascular:  Heart Findings: Rate: Normal  Rhythm: Regular Rhythm  Sounds: Normal  Heart murmur: negative Vascular Findings: Normal    Abdomen:  Abdomen Findings: Normal    Musculoskeletal:  Musculoskeletal Findings: Normal   Skin:  Skin Findings: Normal    Mental Status:  Mental Status Findings:  Cooperative, Alert and Oriented         Anesthesia Plan  Type of Anesthesia, risks & benefits discussed:  Anesthesia Type:  general  Patient's Preference:   Intra-op Monitoring Plan:   Intra-op Monitoring Plan Comments:   Post Op Pain Control Plan:   Post Op Pain  Control Plan Comments:   Induction:   IV  Beta Blocker:  Patient is not currently on a Beta-Blocker (No further documentation required).       Informed Consent: Patient understands risks and agrees with Anesthesia plan.  Questions answered. Anesthesia consent signed with patient.  ASA Score: 3     Day of Surgery Review of History & Physical:        Anesthesia Plan Notes: Mild b/l expiratory wheeze.  Albuterol given and resolved.  Will proceed with GETA.        Ready For Surgery From Anesthesia Perspective.

## 2017-12-01 NOTE — TRANSFER OF CARE
"Anesthesia Transfer of Care Note    Patient: Brittany Patricio    Procedure(s) Performed: Procedure(s) (LRB):  EXCISION-BONE SPUR-FOOT (Left)  LENGTHENING-TENDON-ACHILLES (SAMIR) (Left)    Patient location: PACU    Anesthesia Type: general    Transport from OR: Transported from OR on 6-10 L/min O2 by face mask with adequate spontaneous ventilation    Post pain: adequate analgesia    Post assessment: no apparent anesthetic complications    Post vital signs: stable    Level of consciousness: sedated    Nausea/Vomiting: no nausea/vomiting    Complications: none    Transfer of care protocol was followed      Last vitals:   Visit Vitals  BP (!) 149/92 (BP Location: Left arm, Patient Position: Lying)   Pulse 100   Temp 36.6 °C (97.8 °F) (Axillary)   Resp 18   Ht 5' 4" (1.626 m)   Wt (!) 147.4 kg (325 lb)   SpO2 96%   BMI 55.79 kg/m²     "

## 2017-12-01 NOTE — BRIEF OP NOTE
[facility]  Brief Operative Note     SUMMARY     Surgery Date: 12/1/2017     Surgeon(s) and Role:     * Orion Mcdermott DPM - Primary     * Emma Arzola MD - Resident - Assisting        Pre-op Diagnosis:  Enthesopathy [M77.9]  Heel spur, left [M77.32]  Chronic heel pain, left [M79.672, G89.29]  Acquired equinus deformity of both feet [M21.6X1, M21.6X2]    Post-op Diagnosis:  Post-Op Diagnosis Codes:     * Enthesopathy [M77.9]     * Heel spur, left [M77.32]     * Chronic heel pain, left [M79.672, G89.29]     * Acquired equinus deformity of both feet [M21.6X1, M21.6X2]    Procedure(s) (LRB):  EXCISION-BONE SPUR-FOOT (Left)  LENGTHENING-TENDON-ACHILLES (SAMIR) (Left)    Anesthesia: General    Description of the findings of the procedure: Retrocalcaneal exostectomy and open SAMIR.     Findings/Key Components: same as above     Estimated Blood Loss:  <5ml         Specimens:   Specimen (12h ago through future)    None          Discharge Note    SUMMARY     Admit Date: 12/1/2017    Discharge Date and Time:  12/01/2017 3:48 PM    Hospital Course (synopsis of major diagnoses, care, treatment, and services provided during the course of the hospital stay): Patient was admitted for an inpatient procedure and tolerated the procedure well with no complications.       Final Diagnosis: Post-Op Diagnosis Codes:     * Enthesopathy [M77.9]     * Heel spur, left [M77.32]     * Chronic heel pain, left [M79.672, G89.29]     * Acquired equinus deformity of both feet [M21.6X1, M21.6X2]    Disposition: Home or Self Care    Follow Up/Patient Instructions:     Medications:  Reconciled Home Medications:   Current Discharge Medication List      CONTINUE these medications which have NOT CHANGED    Details   carisoprodol (SOMA) 350 MG tablet Take 350 mg by mouth 4 (four) times daily as needed.      clonazePAM (KLONOPIN) 1 MG tablet Take 1 mg by mouth 2 (two) times daily as needed for Anxiety.      gabapentin (NEURONTIN) 300 MG  capsule Take 1 capsule (300 mg total) by mouth 3 (three) times daily.  Qty: 30 capsule, Refills: 0      pantoprazole (PROTONIX) 40 MG tablet       PROAIR HFA 90 mcg/actuation inhaler       sertraline (ZOLOFT) 50 MG tablet Take 50 mg by mouth once daily.      benazepril-hydrochlorthiazide (LOTENSIN HCT) 10-12.5 mg Tab Take 1 tablet by mouth once daily.      buPROPion (WELLBUTRIN XL) 300 MG 24 hr tablet Take 300 mg by mouth once daily.      cetirizine 10 mg chewable tablet Take 50 mg by mouth once daily.      lidocaine HCL 2% (XYLOCAINE) 2 % jelly Apply topically as needed. Apply topically once nightly to affected part left heel.  Qty: 30 mL, Refills: 2      meloxicam (MOBIC) 15 MG tablet Take 15 mg by mouth once daily.             Discharge Procedure Orders  Diet general     Weight bearing restrictions (specify)   Order Comments: NWB left foot     Call MD for:  temperature >100.4     Call MD for:  persistent nausea and vomiting     Call MD for:  severe uncontrolled pain     Call MD for:  difficulty breathing, headache or visual disturbances     Call MD for:  redness, tenderness, or signs of infection (pain, swelling, redness, odor or green/yellow discharge around incision site)     Leave dressing on - Keep it clean, dry, and intact until clinic visit

## 2017-12-02 NOTE — PROGRESS NOTES
Prescription given to [facility] to give to pt's daughter, Maisha Hdz. Will continue to monitor.

## 2017-12-02 NOTE — PLAN OF CARE
Discharge instructions reviewed with pt and daughter. Understanding verbalized. No complaints of pain reported. Pt able to tolerate PO intake. Consents placed in chart. Paper prescriptions filled by daughter. Transported to car by PCT.

## 2017-12-02 NOTE — DISCHARGE INSTRUCTIONS
Recovery After Procedural Sedation (Adult)  You have been given medicine by vein to make you sleep during your surgery. This may have included both a pain medicine and sleeping medicine. Most of the effects have worn off. But you may still have some drowsiness for the next 6 to 8 hours.  Home care  Follow these guidelines when you get home:  · For the next 8 hours, you should be watched by a responsible adult. This person should make sure your condition is not getting worse.  · Don't drink any alcohol for the next 24 hours.  · Don't drive, operate dangerous machinery, or make important business or personal decisions during the next 24 hours.  Note: Your healthcare provider may tell you not to take any medicine by mouth for pain or sleep in the next 4 hours. These medicines may react with the medicines you were given in the hospital. This could cause a much stronger response than usual.  Follow-up care  Follow up with your healthcare provider if you are not alert and back to your usual level of activity within 12 hours.  When to seek medical advice  Call your healthcare provider right away if any of these occur:  · Drowsiness gets worse  · Weakness or dizziness gets worse  · Repeated vomiting  · You can't be awakened   Date Last Reviewed: 10/18/2016  © 3360-1670 [email or web address]. [address] [place] [address], [place] [address]. All rights reserved. This information is not intended as a substitute for professional medical care. Always follow your healthcare professional's instructions.

## 2017-12-03 ENCOUNTER — TELEPHONE (OUTPATIENT)
Dept: PODIATRY | Facility: HOSPITAL | Age: 52
End: 2017-12-03

## 2017-12-03 ENCOUNTER — HOSPITAL ENCOUNTER (EMERGENCY)
Facility: HOSPITAL | Age: 52
Discharge: HOME | End: 2017-12-03
Attending: EMERGENCY MEDICINE
Payer: COMMERCIAL

## 2017-12-03 VITALS
HEART RATE: 90 BPM | OXYGEN SATURATION: 97 % | SYSTOLIC BLOOD PRESSURE: 122 MMHG | RESPIRATION RATE: 18 BRPM | BODY MASS INDEX: 50.02 KG/M2 | HEIGHT: 64 IN | WEIGHT: 293 LBS | DIASTOLIC BLOOD PRESSURE: 59 MMHG | TEMPERATURE: 100 F

## 2017-12-03 DIAGNOSIS — R04.2 HEMOPTYSIS: Primary | ICD-10-CM

## 2017-12-03 DIAGNOSIS — R05.9 COUGHING: ICD-10-CM

## 2017-12-03 LAB
ALBUMIN SERPL BCP-MCNC: 3.4 G/DL
ALP SERPL-CCNC: 123 U/L
ALT SERPL W/O P-5'-P-CCNC: 43 U/L
ANION GAP SERPL CALC-SCNC: 9 MMOL/L
AST SERPL-CCNC: 25 U/L
BASOPHILS # BLD AUTO: 0.06 K/UL
BASOPHILS NFR BLD: 0.6 %
BILIRUB SERPL-MCNC: 0.8 MG/DL
BUN SERPL-MCNC: 14 MG/DL
CALCIUM SERPL-MCNC: 9.3 MG/DL
CHLORIDE SERPL-SCNC: 101 MMOL/L
CO2 SERPL-SCNC: 27 MMOL/L
CREAT SERPL-MCNC: 0.8 MG/DL
D DIMER PPP IA.FEU-MCNC: 0.63 MG/L FEU
DIFFERENTIAL METHOD: ABNORMAL
EOSINOPHIL # BLD AUTO: 0 K/UL
EOSINOPHIL NFR BLD: 0.1 %
ERYTHROCYTE [DISTWIDTH] IN BLOOD BY AUTOMATED COUNT: 14.2 %
EST. GFR  (AFRICAN AMERICAN): >60 ML/MIN/1.73 M^2
EST. GFR  (NON AFRICAN AMERICAN): >60 ML/MIN/1.73 M^2
GLUCOSE SERPL-MCNC: 133 MG/DL
HCT VFR BLD AUTO: 38.2 %
HGB BLD-MCNC: 11.8 G/DL
IMM GRANULOCYTES # BLD AUTO: 0.11 K/UL
IMM GRANULOCYTES NFR BLD AUTO: 1 %
LYMPHOCYTES # BLD AUTO: 3.3 K/UL
LYMPHOCYTES NFR BLD: 30.6 %
MCH RBC QN AUTO: 27.4 PG
MCHC RBC AUTO-ENTMCNC: 30.9 G/DL
MCV RBC AUTO: 89 FL
MONOCYTES # BLD AUTO: 0.7 K/UL
MONOCYTES NFR BLD: 6.1 %
NEUTROPHILS # BLD AUTO: 6.6 K/UL
NEUTROPHILS NFR BLD: 61.6 %
NRBC BLD-RTO: 0 /100 WBC
PLATELET # BLD AUTO: 208 K/UL
PMV BLD AUTO: 10.8 FL
POTASSIUM SERPL-SCNC: 4 MMOL/L
PROT SERPL-MCNC: 7.1 G/DL
RBC # BLD AUTO: 4.31 M/UL
SODIUM SERPL-SCNC: 137 MMOL/L
WBC # BLD AUTO: 10.7 K/UL

## 2017-12-03 PROCEDURE — 25000003 PHARM REV CODE 250: Performed by: EMERGENCY MEDICINE

## 2017-12-03 PROCEDURE — 96374 THER/PROPH/DIAG INJ IV PUSH: CPT

## 2017-12-03 PROCEDURE — 80053 COMPREHEN METABOLIC PANEL: CPT

## 2017-12-03 PROCEDURE — 96361 HYDRATE IV INFUSION ADD-ON: CPT

## 2017-12-03 PROCEDURE — 25500020 PHARM REV CODE 255: Performed by: EMERGENCY MEDICINE

## 2017-12-03 PROCEDURE — 85025 COMPLETE CBC W/AUTO DIFF WBC: CPT

## 2017-12-03 PROCEDURE — 85379 FIBRIN DEGRADATION QUANT: CPT

## 2017-12-03 PROCEDURE — 99285 EMERGENCY DEPT VISIT HI MDM: CPT | Mod: 25

## 2017-12-03 PROCEDURE — 99285 EMERGENCY DEPT VISIT HI MDM: CPT | Mod: ,,, | Performed by: EMERGENCY MEDICINE

## 2017-12-03 PROCEDURE — 63600175 PHARM REV CODE 636 W HCPCS: Performed by: EMERGENCY MEDICINE

## 2017-12-03 RX ORDER — DIPHENHYDRAMINE HYDROCHLORIDE 50 MG/ML
50 INJECTION INTRAMUSCULAR; INTRAVENOUS
Status: COMPLETED | OUTPATIENT
Start: 2017-12-03 | End: 2017-12-03

## 2017-12-03 RX ORDER — BUTALBITAL, ACETAMINOPHEN AND CAFFEINE 50; 325; 40 MG/1; MG/1; MG/1
1 TABLET ORAL
Status: COMPLETED | OUTPATIENT
Start: 2017-12-03 | End: 2017-12-03

## 2017-12-03 RX ADMIN — BUTALBITAL, ACETAMINOPHEN, AND CAFFEINE 1 TABLET: 50; 325; 40 TABLET ORAL at 11:12

## 2017-12-03 RX ADMIN — DIPHENHYDRAMINE HYDROCHLORIDE 50 MG: 50 INJECTION, SOLUTION INTRAMUSCULAR; INTRAVENOUS at 12:12

## 2017-12-03 RX ADMIN — IOHEXOL 100 ML: 350 INJECTION, SOLUTION INTRAVENOUS at 01:12

## 2017-12-03 RX ADMIN — SODIUM CHLORIDE 1000 ML: 0.9 INJECTION, SOLUTION INTRAVENOUS at 11:12

## 2017-12-03 NOTE — ED NOTES
LOC: The patient is awake, alert and aware of environment with an appropriate affect, the patient is oriented x 3 and speaking appropriately.  APPEARANCE: Patient resting comfortably and in no acute distress, patient is clean and well groomed  SKIN: The skin is warm and dry, color consistent with ethnicity, patient has normal skin turgor and moist mucus membranes, skin intact, no breakdown or brusing noted.  MUSCULOSKELETAL: Patient moving all extremities well, pt has cast to LLE.  RESPIRATORY: Airway is open and patent, breath sounds clear throughout all lung fields; respirations are spontaneous, patient has a normal effort and rate, no accessory muscle use noted.   CARDIAC: Patient has no peripheral edema noted, capillary refill < 3 seconds. No complaints of chest pain   ABDOMEN: Soft and non tender to palpation, no distention noted. Bowel sounds present x 4  NEUROLOGIC: PERRL, 3mm bilaterally, eyes open spontaneously, behavior appropriate to situation, follows commands, facial expression symmetrical, bilateral hand grasp equal and even, purposeful motor response noted, normal sensation in all extremities when touched with a finger.

## 2017-12-03 NOTE — ED NOTES
Rounding  There is no change in patient status at this time and is resting comfortably in bed with side rails up, call bell in reach, and the bed locked and in its lowest position.   Patient appears to be in no acute distress at this time and denies chest pain and shortness of breath. Respirations are even and unlabored with equal chest rise and fall.  Patient given plan of care update. With no complaints or concerns from the patient, the nurse will continue to monitor patient and keep patient up-to-date with progress of care plan and provide support. Patient advised to call with any needs. Family at bedside.

## 2017-12-03 NOTE — ED NOTES
Pt presents s/p fall, states she fell down 2 stairs but did not hit head. Pt states she had foot surgery on Friday and was intubated. Pt states since then she has been coughing up blood clots and called the on call nurse who instructed her to come in to r/o PE. Pt has no respiratory distress or other complaints at this time. Pt is slightly tachycardic.

## 2017-12-03 NOTE — ED PROVIDER NOTES
"Encounter Date: 12/3/2017    SCRIBE #1 NOTE: I, Kathy Garcia, am scribing for, and in the presence of,  Dr. London. I have scribed the following portions of the note - the Resident attestation. Other sections scribed: the X-Ray reading.       History     Chief Complaint   Patient presents with    Hemoptysis     hemoptysis onset Friday, pt also reports having left foot surgery on the same day in which she was intubated. Pt states " I am worried I have blood clots in my lungs." no respiratory distress noted.    Fall     pt reports a fall backward down 2 steps, denies head trauma, c/o low back pain and right hip pain     53 yo female with h/o HTN and recent achilles tendon surgery presenting to ED of small amount of hemoptysis (about a quarter size) since Friday after pt was extubated after ortho surgery. Pt denies associated SOB or chest pain. She otherwise feels well. Not on anticoagulation therapy. Has had approx 4-5 episodes since Friday, last episode was just PTA. Pt called nursing line and they recommended pt come in for PE r/o. Pt has no h/o DVT or PE.           Review of patient's allergies indicates:   Allergen Reactions    Iodine and iodide containing products Rash     Past Medical History:   Diagnosis Date    Hypertension     Ruptured disc, thoracic      Past Surgical History:   Procedure Laterality Date    ACHILLES TENDON SURGERY Left      SECTION      HEMORRHOID SURGERY      REFRACTIVE SURGERY      TONSILLECTOMY, ADENOIDECTOMY       Family History   Problem Relation Age of Onset    Hypertension Mother     Hypertension Father     COPD Father      Social History   Substance Use Topics    Smoking status: Former Smoker    Smokeless tobacco: Never Used      Comment: quit in     Alcohol use Yes      Comment: occassionally     Review of Systems   Constitutional: Negative for fever.   HENT: Negative for sore throat.    Respiratory: Positive for cough. Negative for apnea, choking, " chest tightness, shortness of breath, wheezing and stridor.    Cardiovascular: Negative for chest pain, palpitations and leg swelling.   Gastrointestinal: Negative for nausea.   Genitourinary: Negative for dysuria.   Musculoskeletal: Negative for back pain.   Skin: Negative for rash.   Neurological: Negative for weakness.   Hematological: Does not bruise/bleed easily.       Physical Exam     Initial Vitals [12/03/17 1056]   BP Pulse Resp Temp SpO2   (!) 147/80 109 20 99.6 °F (37.6 °C) 99 %      MAP       102.33         Physical Exam    Nursing note and vitals reviewed.  Constitutional: She appears well-developed and well-nourished. She is not diaphoretic. No distress.   HENT:   Head: Normocephalic and atraumatic.   Mouth/Throat: Oropharynx is clear and moist.   No areas of skin irritation or bleeding noted to posterior oropharynx.    Eyes: Conjunctivae and EOM are normal. Pupils are equal, round, and reactive to light.   Neck: Normal range of motion. Neck supple. No thyromegaly present.   Cardiovascular: Normal rate, regular rhythm, normal heart sounds and intact distal pulses.   No murmur heard.  Pulmonary/Chest: Breath sounds normal. No respiratory distress. She has no wheezes. She has no rhonchi. She has no rales.   Musculoskeletal:   Left leg in splint, neurovascularly intact. Right leg without edema or calf tenderness     Lymphadenopathy:     She has no cervical adenopathy.   Neurological: She is alert and oriented to person, place, and time.   Skin: Skin is warm and dry. No rash noted. No erythema.   Psychiatric: She has a normal mood and affect.         ED Course   Procedures  Labs Reviewed   CBC W/ AUTO DIFFERENTIAL - Abnormal; Notable for the following:        Result Value    Hemoglobin 11.8 (*)     MCHC 30.9 (*)     Immature Granulocytes 1.0 (*)     Immature Grans (Abs) 0.11 (*)     All other components within normal limits   COMPREHENSIVE METABOLIC PANEL - Abnormal; Notable for the following:     Glucose  133 (*)     Albumin 3.4 (*)     All other components within normal limits   D DIMER, QUANTITATIVE - Abnormal; Notable for the following:     D-Dimer 0.63 (*)     All other components within normal limits         PGY-4 MDM A/P:  51 yo female with recent achilles tendon surgery with intubation on Friday c/o small amount of hemptysis daily since intubation.   Diff dx: PE, tracheal/broncial irritation from intubation, mucosal irritation from intubation, bronchitis, pneumonia.   Pt has no other risk factors for PE besides recent surgery and immobilization, however suspicion is low as pt has no c/o SOB or chest pain. Will obtain D dimer, CXR and labs.     Odessa Angel MD  [facility] PGY-4  11:30 AM      PGY-4 MDM Update:   D dimer is elevated at 0.63, could be from inflammatory process s/p surgery, however can't r/o PE in setting of hemoptysis and tachycardia. Will obtain CTA chest. Pt reports mild reaction to contrast in the past with MRIs, however denies HIVes or SOB with contrast, no known anaphyalxis reaction, no other drug allergies. I discussed V/Q scan vs CTA with this patient and she wants to proceed with CTA and understands risk of possible recurrent reaction. Will pre treat with benadryl and re-assess.     Odessa Angel MD PGY-4  12:15 PM    PGY-4 MDM Update:  CTA negative for acute PE, small amount of pulmon edema noted but not clinically significant. pt has had no further episodes of hemoptysis since ED arrival. No allergic reaction to contrast, pt feels ready for discharge. Pt instructed to f/u with PCP in several days or to return to ED with any worsening symptoms. Likely bronchitis vs tracheal irritation from recent intubation. Likely will resolve in a few days.     Odessa Angel MD PGY-4  3:20 PM        X-Rays:   Independently Interpreted Readings:   Chest X-Ray: No acute process.     Medical Decision Making:   History:   Old Medical Records: I decided to obtain old medical  records.  Independently Interpreted Test(s):   I have ordered and independently interpreted X-rays - see prior notes.  Clinical Tests:   Radiological Study: Ordered and Reviewed            Scribe Attestation:   Scribe #1: I performed the above scribed service and the documentation accurately describes the services I performed. I attest to the accuracy of the note.    Attending Attestation:   Physician Attestation Statement for Resident:  As the supervising MD   Physician Attestation Statement: I have personally seen and examined this patient.   I agree with the above history. -: Streaks of hemoptysis for the last 2 days. No chest pain or SOB, doubt ACS of CHF.  No history of TB and no TB risk factors. Patient was mildly tachycardic on arrival, Wells score of 1.5 for tachycardia and d-dimer elevated. CT PE neg. I see no emergent indication for hospitalization at this time.  Patient advised indications to return.   As the supervising MD I agree with the above PE.    As the supervising MD I agree with the above treatment, course, plan, and disposition.          Physician Attestation for Scribe:      Comments: I, Dr. Saleem London, personally performed the services described in this documentation. All medical record entries made by the scribe were at my direction and in my presence.  I have reviewed the chart and agree that the record reflects my personal performance and is accurate and complete. Saleem London MD.  8:57 PM 12/03/2017              ED Course      Clinical Impression:   The primary encounter diagnosis was Hemoptysis. A diagnosis of Coughing was also pertinent to this visit.                           Odessa Angel MD  Resident  12/03/17 2052       Saleem London MD  12/03/17 2057

## 2017-12-03 NOTE — TELEPHONE ENCOUNTER
Patient called complaining of coughing up blood since 12/1.  S/p Lazara 12/1 per Dr. Mcdermott.  States she fell on her back while going up stairs, avoiding falling on surgical limb, doesn't know if fall happened before or after she started coughing up blood.  Also complains of Ha, denies SOB, F/C/N/V, or history of blood clots.  I recommended that she go to the ER for further evaluation and treatment.  Patient agreeable, and stated she will go to the ER today.

## 2017-12-04 NOTE — ANESTHESIA POSTPROCEDURE EVALUATION
"Anesthesia Post Evaluation    Patient: Brittany Patricio    Procedure(s) Performed: Procedure(s) (LRB):  EXCISION-BONE SPUR-FOOT (Left)  LENGTHENING-TENDON-ACHILLES (SAMIR) (Left)    Final Anesthesia Type: general  Patient location during evaluation: PACU  Patient participation: Yes- Able to Participate  Level of consciousness: awake and alert  Post-procedure vital signs: reviewed and stable  Pain management: adequate  Airway patency: patent  PONV status at discharge: No PONV  Anesthetic complications: no      Cardiovascular status: blood pressure returned to baseline  Respiratory status: unassisted and spontaneous ventilation  Hydration status: euvolemic  Follow-up not needed.        Visit Vitals  /70   Pulse 109   Temp 36.4 °C (97.5 °F) (Skin)   Resp 12   Ht 5' 4" (1.626 m)   Wt (!) 147.4 kg (325 lb)   SpO2 (!) 92%   BMI 55.79 kg/m²       Pain/Tracy Score: Pain Rating Prior to Med Admin: 4 (12/3/2017 11:36 AM)      "

## 2017-12-08 ENCOUNTER — TELEPHONE (OUTPATIENT)
Dept: PODIATRY | Facility: CLINIC | Age: 52
End: 2017-12-08

## 2017-12-08 NOTE — TELEPHONE ENCOUNTER
----- Message from Genevieve Charles sent at 12/8/2017  6:51 AM CST -----  Contact: Pt  Pt called to speak to the nurse to cancel/reschedule her post op appt scheduled for 10:30 am today, 12/8/2017 with the provider due to the weather.    Pt can be reached at [phone].    Thanks

## 2017-12-11 ENCOUNTER — TELEPHONE (OUTPATIENT)
Dept: PODIATRY | Facility: CLINIC | Age: 52
End: 2017-12-11

## 2017-12-11 NOTE — TELEPHONE ENCOUNTER
Spoke with Ms Soto attempted to schedule Post op appointment she is unable to schedule at this time not sure when she can get a ride will call back when she has a date and time for her ride.

## 2017-12-11 NOTE — TELEPHONE ENCOUNTER
----- Message from Diana Galdamez sent at 12/11/2017 10:27 AM CST -----  Contact: self/home   Pt was informed by Dr. Clara Ambrosio nurse, to call today to reschedule her post op appt for tomorrow anytime before 10.

## 2017-12-11 NOTE — TELEPHONE ENCOUNTER
I have a patient of Dr. Mcdermott that need to be see tomorrow because Dr. Mcdermott was not here on Friday pass.The patient had surgery with Dr. Mcdermott and have a cast on patient will be here around 10:30. You are the on call doctor can you see the patient for 10:30 or 11:00 please advise me

## 2017-12-12 ENCOUNTER — OFFICE VISIT (OUTPATIENT)
Dept: PODIATRY | Facility: CLINIC | Age: 52
End: 2017-12-12
Payer: COMMERCIAL

## 2017-12-12 VITALS
WEIGHT: 147 LBS | HEIGHT: 64 IN | BODY MASS INDEX: 25.1 KG/M2 | DIASTOLIC BLOOD PRESSURE: 99 MMHG | HEART RATE: 92 BPM | SYSTOLIC BLOOD PRESSURE: 166 MMHG

## 2017-12-12 DIAGNOSIS — Z98.890 POST-OPERATIVE STATE: Primary | ICD-10-CM

## 2017-12-12 PROCEDURE — 99999 PR PBB SHADOW E&M-EST. PATIENT-LVL III: CPT | Mod: PBBFAC,,, | Performed by: PODIATRIST

## 2017-12-12 PROCEDURE — 99024 POSTOP FOLLOW-UP VISIT: CPT | Mod: S$GLB,,, | Performed by: PODIATRIST

## 2017-12-12 NOTE — PROGRESS NOTES
Subjective:      Patient ID: Brittany Patricio is a 52 y.o. female.    Chief Complaint: Post-op Evaluation (left ft./ PCP Dr. Dao)    Presents to clinic 11 day s/p SAMIR and retrocalc exostectomy. Has been in NWB fiberglass cast with wheelchair. Difficult for her to get around but she is doing her best. Pain present but controlled with medication.     Review of Systems   Constitution: Negative for chills, fever, malaise/fatigue and night sweats.   Cardiovascular: Positive for leg swelling.   Skin:        Surgical incision posterior left heel   Gastrointestinal: Negative for constipation, diarrhea, nausea and vomiting.   Neurological: Negative for numbness, paresthesias and sensory change.           Objective:      Physical Exam   Constitutional: She appears well-developed and well-nourished. She is cooperative. No distress.   Cardiovascular:   Pulses:       Popliteal pulses are 2+ on the right side, and 2+ on the left side.        Dorsalis pedis pulses are 2+ on the right side, and 2+ on the left side.        Posterior tibial pulses are 2+ on the right side, and 2+ on the left side.   Capillary refill 3 seconds all toes/distal feet, all toes/both feet warm to touch.     + post op edema noted to left ankle and foot   Musculoskeletal:        Right ankle: Normal. She exhibits normal range of motion, no swelling, no ecchymosis, no deformity, no laceration and normal pulse. Achilles tendon normal. Achilles tendon exhibits no pain, no defect and normal Mendez's test results.   + pain with gentle ROM of left ankle and direct palpation of surgical site.      Lymphadenopathy:   Negative lymphadenopathy bilateral popliteal fossa and tarsal tunnel.   Neurological: She is alert. She has normal strength. No sensory deficit. She exhibits normal muscle tone.   Reflex Scores:       Patellar reflexes are 2+ on the right side and 2+ on the left side.       Achilles reflexes are 2+ on the right side and 2+ on the left side.  Negative  tinel sign to percussion sural, superficial peroneal, deep peroneal, saphenous, and posterior tibial nerves right and left ankles and feet.     Skin: Skin is warm, dry and intact. Bruising (left heel) noted.   Posterior left heel with lengthy incision, well coapted with sutures intact with no signs of gapping. No drainage. No erythema, no purulence or SOI. There is mild ecchymosis/bruising along the medial and lateral heel consistent with post op status.                Assessment:       Encounter Diagnosis   Name Primary?    Post-operative state Yes         Plan:       Brittany was seen today for post-op evaluation.    Diagnoses and all orders for this visit:    Post-operative state      I counseled the patient on her conditions, their implications and medical management.    Adequate post op course at this time. NWB fibgerglass cast removed and reapplied by Carrie Dillon.     Surgical site with well adhered incision with no complicaitons of gapping or infection at this time.     Continue NWB status in wheelchair at all times.     Continue pain medication as directed for pain    The patient is advised to apply ice or cold packs intermittently as needed to relieve pain. This will be best done behind knee for 20 minute increments. Do not apply directly over cast.     F/u with Dr. Mcdermott in 2 weeks for suture removal, advised to call sooner if any problems arise.

## 2017-12-22 ENCOUNTER — OFFICE VISIT (OUTPATIENT)
Dept: PODIATRY | Facility: CLINIC | Age: 52
End: 2017-12-22
Payer: COMMERCIAL

## 2017-12-22 ENCOUNTER — HOSPITAL ENCOUNTER (OUTPATIENT)
Dept: RADIOLOGY | Facility: HOSPITAL | Age: 52
Discharge: HOME | End: 2017-12-22
Attending: PODIATRIST
Payer: COMMERCIAL

## 2017-12-22 VITALS — HEIGHT: 64 IN | BODY MASS INDEX: 50.02 KG/M2 | WEIGHT: 293 LBS

## 2017-12-22 DIAGNOSIS — Z98.890 POST-OPERATIVE STATE: ICD-10-CM

## 2017-12-22 DIAGNOSIS — Z98.890 POST-OPERATIVE STATE: Primary | ICD-10-CM

## 2017-12-22 PROCEDURE — 73650 X-RAY EXAM OF HEEL: CPT | Mod: 26,LT,, | Performed by: RADIOLOGY

## 2017-12-22 PROCEDURE — 99999 PR PBB SHADOW E&M-EST. PATIENT-LVL III: CPT | Mod: PBBFAC,,, | Performed by: PODIATRIST

## 2017-12-22 PROCEDURE — 73650 X-RAY EXAM OF HEEL: CPT | Mod: TC,LT

## 2017-12-22 PROCEDURE — 99024 POSTOP FOLLOW-UP VISIT: CPT | Mod: S$GLB,,, | Performed by: PODIATRIST

## 2017-12-22 NOTE — PROGRESS NOTES
Subjective:      Patient ID: Brittany Patricio is a 52 y.o. female.    Chief Complaint: Post-op Evaluation (3 week)    3 weeks s/p posterior heel surgery with SAMIR left.  nwb in bk cast.  Pain minimal.  Denies signs infection.    Review of Systems   Constitution: Negative for chills, diaphoresis, fever, malaise/fatigue and night sweats.   Cardiovascular: Negative for claudication, cyanosis, leg swelling and syncope.   Skin: Negative for color change, dry skin, nail changes, rash, suspicious lesions and unusual hair distribution.   Musculoskeletal: Negative for falls, joint pain, joint swelling, muscle cramps, muscle weakness and stiffness.   Gastrointestinal: Negative for constipation, diarrhea, nausea and vomiting.   Neurological: Negative for brief paralysis, disturbances in coordination, focal weakness, numbness, paresthesias, sensory change and tremors.           Objective:      Physical Exam   Cardiovascular:   Pulses:       Dorsalis pedis pulses are 2+ on the right side, and 2+ on the left side.        Posterior tibial pulses are 2+ on the right side, and 2+ on the left side.   DP and PT pulses 2/4 bilateral, capillary refill 3 seconds all toes/distal feet, all toes/both feet warm to touch.  Negative lymphadenopathy bilateral popliteal fossa and tarsal tunnel.  Negavie lower extremity edema bilateral.    Negative Dyer test and Heaven sign right and left lower extremities.       Musculoskeletal:   Good position and passive range of motion for this phase post op.   Lymphadenopathy:   Negative lymphadenopathy bilateral popliteal fossa and tarsal tunnel.  Negative lymphangitic streaking bilateral foot/ankle bilateral.     Neurological:   Negative tinel sign to percussion sural, superficial peroneal, deep peroneal, saphenous, and posterior tibial nerves right and left ankles and feet.     Skin:   Incision posterior heel left foot well approximated, good skin apposition, sutures intact without gaps, drainage, pus,  tracking, fluctuance, malodor, or signs of infection.    Otherwise, Skin is normal age and health appropriate color, turgor, texture, and temperature bilateral lower extremities without ulceration, hyperpigmentation, discoloration, masses nodules or cords palpated.  No ecchymosis, erythema, edema, or cardinal signs of infection bilateral lower extremities.               Assessment:       Encounter Diagnosis   Name Primary?    Post-operative state Yes         Plan:       Brittany was seen today for post-op evaluation.    Diagnoses and all orders for this visit:    Post-operative state  -     X-Ray Calcaneus 2 View Left; Future      I counseled the patient on her conditions, their implications and medical management.        Removed sutures without event.  Covered with steri strips, wound foam, kerlix, ACE in neutral position left.    BK fx boot left.  Remove 3 x daily for ankle alphabet without resistance or WB at any time.    Xray left heel.          Return in about 3 weeks (around 1/12/2018).

## 2018-01-08 ENCOUNTER — TELEPHONE (OUTPATIENT)
Dept: PODIATRY | Facility: CLINIC | Age: 53
End: 2018-01-08

## 2018-01-08 NOTE — TELEPHONE ENCOUNTER
Call to preferred number to discuss need to r/s podiatry appt for this Friday, no answer no voice mailbox set up.  New appt made, slip mailed

## 2018-01-09 ENCOUNTER — TELEPHONE (OUTPATIENT)
Dept: PODIATRY | Facility: CLINIC | Age: 53
End: 2018-01-09

## 2018-01-09 NOTE — TELEPHONE ENCOUNTER
Patient called stated that [facility], states that they faxed a medical release requesting medical records

## 2018-01-09 NOTE — TELEPHONE ENCOUNTER
----- Message from Cj Ward sent at 1/9/2018  1:21 PM CST -----  Contact: self   Patient need you to fill out disability paperwork that was fax over to your office today and please return back, any questions please call back at [phone] (home)

## 2018-01-12 ENCOUNTER — OFFICE VISIT (OUTPATIENT)
Dept: PODIATRY | Facility: CLINIC | Age: 53
End: 2018-01-12
Payer: COMMERCIAL

## 2018-01-12 VITALS — HEIGHT: 64 IN | DIASTOLIC BLOOD PRESSURE: 82 MMHG | SYSTOLIC BLOOD PRESSURE: 144 MMHG | HEART RATE: 96 BPM

## 2018-01-12 DIAGNOSIS — M77.9 ENTHESOPATHY: ICD-10-CM

## 2018-01-12 DIAGNOSIS — T81.31XA DISRUPTION OR DEHISCENCE OF CLOSURE OF SKIN, INITIAL ENCOUNTER: Primary | ICD-10-CM

## 2018-01-12 PROCEDURE — 87070 CULTURE OTHR SPECIMN AEROBIC: CPT

## 2018-01-12 PROCEDURE — 99999 PR PBB SHADOW E&M-EST. PATIENT-LVL IV: CPT | Mod: PBBFAC,,, | Performed by: PODIATRIST

## 2018-01-12 PROCEDURE — 87075 CULTR BACTERIA EXCEPT BLOOD: CPT

## 2018-01-12 PROCEDURE — 99024 POSTOP FOLLOW-UP VISIT: CPT | Mod: S$GLB,,, | Performed by: PODIATRIST

## 2018-01-12 NOTE — PROGRESS NOTES
"Subjective:      Patient ID: Brittany Patricio is a 52 y.o. female.    Chief Complaint: Post-op Evaluation (left ft./ PCP Dr. Dao)    Pt. returns to the clinic 6 weeks S/P foot surgery to L heel. No c/o fever, chiils, sweats. No pain, just a little sticking seneation at the site. Pt. left the bandage intact, but she noticed some drainage on Monday and removed it. She left the steri-strips intact. She is in a CAM walker boot non-weight bearing and presents in a wheelchair.     ROS  No fever, chills, sweats, malaise, NV. No calf pain.    Vitals:    01/12/18 1038   BP: (!) 144/82   Pulse: 96   Height: 5' 4" (1.626 m)   PainSc: 0-No pain           Objective:      Physical Exam   Constitutional: She appears well-developed.   Cardiovascular:   Pulses:       Dorsalis pedis pulses are 2+ on the right side, and 2+ on the left side.        Posterior tibial pulses are 2+ on the right side, and 2+ on the left side.   DP and PT pulses 2/4 bilateral, capillary refill 3 seconds all toes/distal feet, all toes/both feet warm to touch.  Negative lymphadenopathy bilateral popliteal fossa and tarsal tunnel.  No significant lower extremity edema bilateral.     Musculoskeletal: She exhibits tenderness (mild at Achilles distally only.).   Good position and passive range of motion for this phase post op. Negative calf squeeze.   Lymphadenopathy:   Negative lymphadenopathy bilateral popliteal fossa and tarsal tunnel.  Negative lymphangitic streaking bilateral foot/ankle bilateral.     Skin:   Incision posterior heel left foot well approximated down to distal area that is dehisced approximately 1.5 x 0.5 cm. Minimal erythema. No other signs of acute infection. Base is mostly fibrotic and tender to touch. No lionel pus nor malodor is noted. Most distal site with one suture remaining that I removed today without sequelae.          L posterior heel         Assessment:       Encounter Diagnoses   Name Primary?    Disruption or dehiscence of " closure of skin, initial encounter Yes    Enthesopathy    Satisfactory progress with small dehiscence but no significant signs of infection.       Plan:       Brittany was seen today for post-op evaluation.    Diagnoses and all orders for this visit:    Disruption or dehiscence of closure of skin, initial encounter  -     Aerobic culture  -     Culture, Anaerobic    Enthesopathy    - Patient was given written and verbal instructions regarding foot condition.  I counseled the patient on her conditions, their implications and medical management.     - The last suture was removed as were the steri-strips and the wound was cleansed as much as possible, cultured, and dressed with Iodosorb Gel, Mepilex Border, and Ace Wrap. (She states that her iodine allergy is strictly dye and she does fine with topical iodine products.) The patient is alerted to watch for any signs of infection (redness, pus, pain, increased swelling or fever) and call if such occurs. Home wound care instructions are provided.     - Reported findings to Dr. Mcdermott. He will follow-up next week.     Follow-up in about 1 week (around 1/19/2018).

## 2018-01-12 NOTE — PATIENT INSTRUCTIONS
"1. DRESS YOUR WOUND EVERY 2-3 DAYS AFTER CLEANSING WITH SALINE.    2. DRESS DAILY WITH DRESSINGS ORDERED BY DR. KNUTSON.    3. ALWAYS KEEP YOUR WOUND COVERED WITH A STERILE BANDAGE. DO NOT LET IT "AIR OUT."    4. WEAR BOOT AT ALL TIMES!     5. PLEASE CALL THE OFFICE IMMEDIATELY IF ANY SIGNS OF INFECTION SUCH AS FEVER, CHILLS, SWEATS, INCREASED REDNESS OR PAIN OR IF ANY NEW PROBLEMS DEVELOP.     6. THE OFFICE NUMBER [phone]. AFTER HOURS, YOU CAN ASK THE [facility]  TO PAGE DR. KNUTSON IN THE EVENT OF AN EMERGENCY [phone].       "

## 2018-01-15 ENCOUNTER — TELEPHONE (OUTPATIENT)
Dept: PODIATRY | Facility: CLINIC | Age: 53
End: 2018-01-15

## 2018-01-15 LAB — BACTERIA SPEC AEROBE CULT: NORMAL

## 2018-01-16 ENCOUNTER — TELEPHONE (OUTPATIENT)
Dept: PODIATRY | Facility: CLINIC | Age: 53
End: 2018-01-16

## 2018-01-16 LAB — BACTERIA SPEC ANAEROBE CULT: NORMAL

## 2018-01-16 NOTE — TELEPHONE ENCOUNTER
----- Message from Kiya Bolanos sent at 1/16/2018 11:39 AM CST -----  Contact: self  Patient called regarding discussing issues from her previous surgery. Please contact [phone] (aroa)

## 2018-01-16 NOTE — TELEPHONE ENCOUNTER
Spoke with Ms Brittany she requested that I fax her clinical note to her insurance company. Medical release on file. Faxed to [phone] via [employer].    Incident# [id number]

## 2018-01-23 ENCOUNTER — HOSPITAL ENCOUNTER (OUTPATIENT)
Dept: RADIOLOGY | Facility: HOSPITAL | Age: 53
Discharge: HOME | End: 2018-01-23
Attending: PODIATRIST
Payer: COMMERCIAL

## 2018-01-23 ENCOUNTER — OFFICE VISIT (OUTPATIENT)
Dept: PODIATRY | Facility: CLINIC | Age: 53
End: 2018-01-23
Payer: COMMERCIAL

## 2018-01-23 VITALS — WEIGHT: 293 LBS | BODY MASS INDEX: 50.02 KG/M2 | HEIGHT: 64 IN

## 2018-01-23 DIAGNOSIS — Z98.890 POST-OPERATIVE STATE: Primary | ICD-10-CM

## 2018-01-23 DIAGNOSIS — T81.31XD DISRUPTION OR DEHISCENCE OF CLOSURE OF SKIN, SUBSEQUENT ENCOUNTER: ICD-10-CM

## 2018-01-23 DIAGNOSIS — Z98.890 POST-OPERATIVE STATE: ICD-10-CM

## 2018-01-23 PROCEDURE — 99024 POSTOP FOLLOW-UP VISIT: CPT | Mod: S$GLB,,, | Performed by: PODIATRIST

## 2018-01-23 PROCEDURE — 99999 PR PBB SHADOW E&M-EST. PATIENT-LVL III: CPT | Mod: PBBFAC,,, | Performed by: PODIATRIST

## 2018-01-23 PROCEDURE — 73650 X-RAY EXAM OF HEEL: CPT | Mod: TC,PO,LT

## 2018-01-23 PROCEDURE — 73650 X-RAY EXAM OF HEEL: CPT | Mod: 26,LT,, | Performed by: RADIOLOGY

## 2018-01-23 NOTE — PROGRESS NOTES
Subjective:      Patient ID: Brittany Patricio is a 52 y.o. female.    Chief Complaint: Post-op Evaluation (left heel )    7 weeks s/p posterior heel surgery with SAMIR left.  nwb in bk fx boot.  Pain minimal.  Denies signs infection.  Dressing wound from dehiscence daily with iodosorb.  Review of Systems   Constitution: Negative for chills, diaphoresis, fever, malaise/fatigue and night sweats.   Cardiovascular: Negative for claudication, cyanosis, leg swelling and syncope.   Skin: Negative for color change, dry skin, nail changes, rash, suspicious lesions and unusual hair distribution.   Musculoskeletal: Negative for falls, joint pain, joint swelling, muscle cramps, muscle weakness and stiffness.   Gastrointestinal: Negative for constipation, diarrhea, nausea and vomiting.   Neurological: Negative for brief paralysis, disturbances in coordination, focal weakness, numbness, paresthesias, sensory change and tremors.           Objective:      Physical Exam   Cardiovascular:   Pulses:       Dorsalis pedis pulses are 2+ on the right side, and 2+ on the left side.        Posterior tibial pulses are 2+ on the right side, and 2+ on the left side.   DP and PT pulses 2/4 bilateral, capillary refill 3 seconds all toes/distal feet, all toes/both feet warm to touch.  Negative lymphadenopathy bilateral popliteal fossa and tarsal tunnel.  Negavie lower extremity edema bilateral.    Negative Dyer test and Heaven sign right and left lower extremities.       Musculoskeletal:   Good position and passive range of motion for this phase post op.   Lymphadenopathy:   Negative lymphadenopathy bilateral popliteal fossa and tarsal tunnel.  Negative lymphangitic streaking bilateral foot/ankle bilateral.     Neurological:   Negative tinel sign to percussion sural, superficial peroneal, deep peroneal, saphenous, and posterior tibial nerves right and left ankles and feet.     Skin:   Wound:  Distal posterior heel incision left foot    Size:     Pre:[id number]      Base:  Granular, pink with tan fibrous mix about 50/50 with minimal serous/serosanaguinous drainage only.  No pus, tracking, fluctuance, malodor, or cardinal signs infection.    Borders:  Hyperkeratotic, debriding to flat, pink, blanchable skin edges without undermining.      Otherwise, Skin is normal age and health appropriate color, turgor, texture, and temperature bilateral lower extremities without ulceration, hyperpigmentation, discoloration, masses nodules or cords palpated.  No ecchymosis, erythema, edema, or cardinal signs of infection bilateral lower extremities.               Assessment:       Encounter Diagnoses   Name Primary?    Post-operative state Yes    Disruption or dehiscence of closure of skin, subsequent encounter          Plan:       Brittany was seen today for post-op evaluation.    Diagnoses and all orders for this visit:    Post-operative state  -     ORTHOTIC DEVICE (DME)    Disruption or dehiscence of closure of skin, subsequent encounter  -     ORTHOTIC DEVICE (DME)    Other orders  -     collagenase ointment; Apply topically once daily.      I counseled the patient on her conditions, their implications and medical management.    Rx santyl for daily application to wound base beneath wound foam and kerlix.    Rx multipodis heel offload boot.  Remove 3 x daily for ankle alphabet without resistance or WB at any time.    Xray left heel.          Follow-up in about 1 week (around 1/30/2018).

## 2018-02-02 ENCOUNTER — OFFICE VISIT (OUTPATIENT)
Dept: PODIATRY | Facility: CLINIC | Age: 53
End: 2018-02-02
Payer: COMMERCIAL

## 2018-02-02 VITALS — HEIGHT: 64 IN | WEIGHT: 293 LBS | BODY MASS INDEX: 50.02 KG/M2

## 2018-02-02 DIAGNOSIS — Z98.890 POST-OPERATIVE STATE: Primary | ICD-10-CM

## 2018-02-02 DIAGNOSIS — T81.31XD DISRUPTION OR DEHISCENCE OF CLOSURE OF SKIN, SUBSEQUENT ENCOUNTER: ICD-10-CM
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
## 2018-02-02 PROCEDURE — 99024 POSTOP FOLLOW-UP VISIT: CPT | Mod: S$GLB,,, | Performed by: PODIATRIST

## 2018-02-02 PROCEDURE — 99999 PR PBB SHADOW E&M-EST. PATIENT-LVL III: CPT | Mod: PBBFAC,,, | Performed by: PODIATRIST

## 2018-02-02 NOTE — PROGRESS NOTES
"Subjective:      Patient ID: Brittany Patricio is a 52 y.o. female.    Chief Complaint: Post-op Evaluation (Left foot)    8 weeks s/p posterior heel surgery with SAMIR left.  nwb in bk fx boot.  Pain minimal.  Denies signs infection.  Dressing wound from dehiscence daily with santyl, border gauze.  Fall 1/27/2017 with "pop" to heel area.  Sudden onset, improved since, aggravated with pressure.  Continues dressings, multipodis boot, knee walker nwb left.  Review of Systems   Constitution: Negative for chills, diaphoresis, fever, malaise/fatigue and night sweats.   Cardiovascular: Negative for claudication, cyanosis, leg swelling and syncope.   Skin: Negative for color change, dry skin, nail changes, rash, suspicious lesions and unusual hair distribution.   Musculoskeletal: Negative for falls, joint pain, joint swelling, muscle cramps, muscle weakness and stiffness.   Gastrointestinal: Negative for constipation, diarrhea, nausea and vomiting.   Neurological: Negative for brief paralysis, disturbances in coordination, focal weakness, numbness, paresthesias, sensory change and tremors.           Objective:      Physical Exam   Cardiovascular:   Pulses:       Dorsalis pedis pulses are 2+ on the right side, and 2+ on the left side.        Posterior tibial pulses are 2+ on the right side, and 2+ on the left side.   DP and PT pulses 2/4 bilateral, capillary refill 3 seconds all toes/distal feet, all toes/both feet warm to touch.  Negative lymphadenopathy bilateral popliteal fossa and tarsal tunnel.  Negavie lower extremity edema bilateral.    Negative Dyer test and Heaven sign right and left lower extremities.       Musculoskeletal:        Left ankle: Normal. No tenderness. Achilles tendon exhibits no pain, no defect and normal Mendez's test results.   Good position and passive range of motion for this phase post op.       Lymphadenopathy:   Negative lymphadenopathy bilateral popliteal fossa and tarsal tunnel.  Negative " lymphangitic streaking bilateral foot/ankle bilateral.     Neurological:   Negative tinel sign to percussion sural, superficial peroneal, deep peroneal, saphenous, and posterior tibial nerves right and left ankles and feet.    Negative allodynia both feet.   Skin:   Wound:  Distal posterior heel incision left foot    Size:    Pre:[id number]      Base:  Granular, pink with tan fibrous mix about 50/50 with minimal serous/serosanaguinous drainage only.  No pus, tracking, fluctuance, malodor, or cardinal signs infection.    Borders:  Hyperkeratotic, debriding to flat, pink, blanchable skin edges without undermining.      Otherwise, Skin is normal age and health appropriate color, turgor, texture, and temperature bilateral lower extremities without ulceration, hyperpigmentation, discoloration, masses nodules or cords palpated.  No ecchymosis, erythema, edema, or cardinal signs of infection bilateral lower extremities.               Assessment:       Encounter Diagnoses   Name Primary?    Post-operative state Yes    Fall, initial encounter     Disruption or dehiscence of closure of skin, subsequent encounter          Plan:       Brittany was seen today for post-op evaluation.    Diagnoses and all orders for this visit:    Post-operative state  -     X-Ray Calcaneus 2 View Left; Future  -     US Extremity Non Vascular Limited Left; Future    Fall, initial encounter  -     X-Ray Calcaneus 2 View Left; Future  -     US Extremity Non Vascular Limited Left; Future    Disruption or dehiscence of closure of skin, subsequent encounter      I counseled the patient on her conditions, their implications and medical management.    Rx santyl for daily application to wound base beneath wound foam and kerlix.    Rx multipodis heel offload boot.  Remove 3 x daily for ankle alphabet without resistance or WB at any time.    Xray left heel, US left achilles.    Continue nwb left all times on knee walker.    Not yet released for work.         Follow-up in about 1 week (around 2/9/2018).

## 2018-02-05 ENCOUNTER — HOSPITAL ENCOUNTER (OUTPATIENT)
Dept: RADIOLOGY | Facility: HOSPITAL | Age: 53
Discharge: HOME | End: 2018-02-05
Attending: PODIATRIST
Payer: COMMERCIAL
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
## 2018-02-05 DIAGNOSIS — Z98.890 POST-OPERATIVE STATE: ICD-10-CM

## 2018-02-05 PROCEDURE — 76882 US LMTD JT/FCL EVL NVASC XTR: CPT | Mod: 26,,, | Performed by: RADIOLOGY

## 2018-02-05 PROCEDURE — 73650 X-RAY EXAM OF HEEL: CPT | Mod: TC,LT

## 2018-02-05 PROCEDURE — 76882 US LMTD JT/FCL EVL NVASC XTR: CPT | Mod: TC

## 2018-02-05 PROCEDURE — 73650 X-RAY EXAM OF HEEL: CPT | Mod: 26,LT,, | Performed by: RADIOLOGY

## 2018-02-09 ENCOUNTER — OFFICE VISIT (OUTPATIENT)
Dept: PODIATRY | Facility: CLINIC | Age: 53
End: 2018-02-09
Payer: COMMERCIAL

## 2018-02-09 VITALS — WEIGHT: 293 LBS | HEIGHT: 64 IN | BODY MASS INDEX: 50.02 KG/M2

## 2018-02-09 DIAGNOSIS — Z98.890 POST-OPERATIVE STATE: Primary | ICD-10-CM

## 2018-02-09 DIAGNOSIS — T81.31XD DISRUPTION OR DEHISCENCE OF CLOSURE OF SKIN, SUBSEQUENT ENCOUNTER: ICD-10-CM

## 2018-02-09 PROCEDURE — 99024 POSTOP FOLLOW-UP VISIT: CPT | Mod: S$GLB,,, | Performed by: PODIATRIST

## 2018-02-09 PROCEDURE — 99999 PR PBB SHADOW E&M-EST. PATIENT-LVL III: CPT | Mod: PBBFAC,,, | Performed by: PODIATRIST

## 2018-02-09 NOTE — PROGRESS NOTES
"Subjective:      Patient ID: Brittany Patricio is a 52 y.o. female.    Chief Complaint: Foot Ulcer (left heel )    9 weeks s/p posterior heel surgery with SAMIR left.  nwb in bk fx boot.  Pain minimal.  Denies signs infection.  Dressing wound from dehiscence daily with santyl, border gauze.  Fall 1/27/2017 with "pop" to heel area.  Sudden onset, improved since, aggravated with pressure.  Continues dressings, multipodis boot, knee walker nwb left.    Negative xrays and US left achilles.    Review of Systems   Constitution: Negative for chills, diaphoresis, fever, malaise/fatigue and night sweats.   Cardiovascular: Negative for claudication, cyanosis, leg swelling and syncope.   Skin: Negative for color change, dry skin, nail changes, rash, suspicious lesions and unusual hair distribution.   Musculoskeletal: Negative for falls, joint pain, joint swelling, muscle cramps, muscle weakness and stiffness.   Gastrointestinal: Negative for constipation, diarrhea, nausea and vomiting.   Neurological: Negative for brief paralysis, disturbances in coordination, focal weakness, numbness, paresthesias, sensory change and tremors.           Objective:      Physical Exam   Cardiovascular:   Pulses:       Dorsalis pedis pulses are 2+ on the right side, and 2+ on the left side.        Posterior tibial pulses are 2+ on the right side, and 2+ on the left side.   DP and PT pulses 2/4 bilateral, capillary refill 3 seconds all toes/distal feet, all toes/both feet warm to touch.  Negative lymphadenopathy bilateral popliteal fossa and tarsal tunnel.  Negavie lower extremity edema bilateral.    Negative Dyer test and Heaven sign right and left lower extremities.       Musculoskeletal:        Left ankle: Normal. No tenderness. Achilles tendon exhibits no pain, no defect and normal Mendez's test results.   Good position and passive range of motion for this phase post op.       Lymphadenopathy:   Negative lymphadenopathy bilateral popliteal fossa " and tarsal tunnel.  Negative lymphangitic streaking bilateral foot/ankle bilateral.     Neurological:   Negative tinel sign to percussion sural, superficial peroneal, deep peroneal, saphenous, and posterior tibial nerves right and left ankles and feet.    Negative allodynia both feet.   Skin:   Wound:  Distal posterior heel incision left foot    Size:    Pre:[id number]      Base:  Granular, pink with tan fibrous mix about 50/50 with minimal serous/serosanaguinous drainage only.  No pus, tracking, fluctuance, malodor, or cardinal signs infection.    Borders:  Hyperkeratotic, debriding to flat, pink, blanchable skin edges without undermining.      Otherwise, Skin is normal age and health appropriate color, turgor, texture, and temperature bilateral lower extremities without ulceration, hyperpigmentation, discoloration, masses nodules or cords palpated.  No ecchymosis, erythema, edema, or cardinal signs of infection bilateral lower extremities.               Assessment:       Encounter Diagnoses   Name Primary?    Post-operative state Yes    Disruption or dehiscence of closure of skin, subsequent encounter          Plan:       Brittany was seen today for foot ulcer.    Diagnoses and all orders for this visit:    Post-operative state  -     WALKER FOR HOME USE    Disruption or dehiscence of closure of skin, subsequent encounter  -     WALKER FOR HOME USE      I counseled the patient on her conditions, their implications and medical management.    Rx santyl for daily application to wound base beneath bordered gauze.    Rx multipodis heel offload boot.  Remove 3 x daily for ankle alphabet without resistance or WB at any time.    Xray left heel, US left achilles.    Begin to bear weight little by little.  First 2 weejs in fracture boot with walker.  If no increase in pain or swelling, progress the next 2 weeks to full weight bearing surgical limb in fracture boot only.  If no increase in pain or swelling, progress slowly to  ambulation in normal shoes as tolerated pending progress and symptoms.  Keep current follow up appointment and attend any prescribed PT or exercises.       Not yet released for work.        Follow-up in about 2 weeks (around 2/23/2018).

## 2018-02-23 ENCOUNTER — OFFICE VISIT (OUTPATIENT)
Dept: PODIATRY | Facility: CLINIC | Age: 53
End: 2018-02-23
Payer: COMMERCIAL

## 2018-02-23 VITALS — WEIGHT: 293 LBS | BODY MASS INDEX: 50.02 KG/M2 | HEIGHT: 64 IN

## 2018-02-23 DIAGNOSIS — L97.422 CHRONIC ULCER OF HEEL, LEFT, WITH FAT LAYER EXPOSED: ICD-10-CM

## 2018-02-23 DIAGNOSIS — Z98.890 POST-OPERATIVE STATE: Primary | ICD-10-CM

## 2018-02-23 PROCEDURE — 99999 PR PBB SHADOW E&M-EST. PATIENT-LVL III: CPT | Mod: PBBFAC,,, | Performed by: PODIATRIST

## 2018-02-23 PROCEDURE — 99024 POSTOP FOLLOW-UP VISIT: CPT | Mod: S$GLB,,, | Performed by: PODIATRIST

## 2018-02-23 RX ORDER — BENAZEPRIL/HYDROCHLOROTHIAZIDE 20 MG-25MG
TABLET ORAL
COMMUNITY
Start: 2017-11-25 | End: 2018-02-23 | Stop reason: SDUPTHER

## 2018-02-23 NOTE — PROGRESS NOTES
"Subjective:      Patient ID: Brittany Patricio is a 52 y.o. female.    Chief Complaint: Post-op Evaluation (Left)    11 weeks s/p posterior heel surgery with SAMIR left.  nwb in bk fx boot.  Pain minimal.  Denies signs infection.  Dressing wound from dehiscence daily with santyl, border gauze.  Fall 1/27/2017 with "pop" to heel area.  Sudden onset, improved since, aggravated with pressure.  Continues dressings, multipodis boot, knee walker nwb left.    Negative xrays and US left achilles.    Review of Systems   Constitution: Negative for chills, diaphoresis, fever, malaise/fatigue and night sweats.   Cardiovascular: Negative for claudication, cyanosis, leg swelling and syncope.   Skin: Negative for color change, dry skin, nail changes, rash, suspicious lesions and unusual hair distribution.   Musculoskeletal: Negative for falls, joint pain, joint swelling, muscle cramps, muscle weakness and stiffness.   Gastrointestinal: Negative for constipation, diarrhea, nausea and vomiting.   Neurological: Negative for brief paralysis, disturbances in coordination, focal weakness, numbness, paresthesias, sensory change and tremors.           Objective:      Physical Exam   Cardiovascular:   Pulses:       Dorsalis pedis pulses are 2+ on the right side, and 2+ on the left side.        Posterior tibial pulses are 2+ on the right side, and 2+ on the left side.   DP and PT pulses 2/4 bilateral, capillary refill 3 seconds all toes/distal feet, all toes/both feet warm to touch.  Negative lymphadenopathy bilateral popliteal fossa and tarsal tunnel.  Negavie lower extremity edema bilateral.    Negative Dyer test and Heaven sign right and left lower extremities.       Musculoskeletal:        Left ankle: Normal. No tenderness. Achilles tendon exhibits no pain, no defect and normal Mendez's test results.   Good position and passive range of motion for this phase post op.       Lymphadenopathy:   Negative lymphadenopathy bilateral popliteal " fossa and tarsal tunnel.  Negative lymphangitic streaking bilateral foot/ankle bilateral.     Neurological:   Negative tinel sign to percussion sural, superficial peroneal, deep peroneal, saphenous, and posterior tibial nerves right and left ankles and feet.    Negative allodynia both feet.   Skin:   Wound:  Distal posterior heel incision left foot    Size:    Pre:[id number]      Base:  Granular, pink with tan fibrous mix about 50/50 with minimal serous/serosanaguinous drainage only.  No pus, tracking, fluctuance, malodor, or cardinal signs infection.    Borders:  Hyperkeratotic, debriding to flat, pink, blanchable skin edges without undermining.      Otherwise, Skin is normal age and health appropriate color, turgor, texture, and temperature bilateral lower extremities without ulceration, hyperpigmentation, discoloration, masses nodules or cords palpated.  No ecchymosis, erythema, edema, or cardinal signs of infection bilateral lower extremities.               Assessment:       Encounter Diagnoses   Name Primary?    Post-operative state Yes    Chronic ulcer of heel, left, with fat layer exposed          Plan:       Brittany was seen today for post-op evaluation.    Diagnoses and all orders for this visit:    Post-operative state    Chronic ulcer of heel, left, with fat layer exposed      I counseled the patient on her conditions, their implications and medical management.    Continue santyl for daily application to wound base beneath bordered gauze.    Rx multipodis heel offload boot.  Remove 3 x daily for ankle alphabet without resistance or WB at any time.    Dressed wound with mepilex border left heel and aperture to offload.    Begin to bear weight little by little. Continue gradual return to wb in fx boot left all times.    Not yet released for work.  [employer] release for work date 03/01/2018.        Follow-up in about 1 week (around 3/2/2018).

## 2018-02-26 ENCOUNTER — TELEPHONE (OUTPATIENT)
Dept: PODIATRY | Facility: CLINIC | Age: 53
End: 2018-02-26

## 2018-02-26 NOTE — TELEPHONE ENCOUNTER
----- Message from Diana Galdamez sent at 2/23/2018 11:46 AM CST -----  Contact: self/home   Pt would like to speak with you regarding her return to work status.

## 2018-02-26 NOTE — TELEPHONE ENCOUNTER
Spoke with pt. Informed he we have not received any paperwork in the office she should refax any paperwork she feels she needs completed. Informed pt once we received paper we would contact her for confirmation.

## 2018-02-27 ENCOUNTER — TELEPHONE (OUTPATIENT)
Dept: PODIATRY | Facility: CLINIC | Age: 53
End: 2018-02-27

## 2018-02-27 NOTE — TELEPHONE ENCOUNTER
Spoke with pt. Informed her papers were received and possibly already filled out however due to the fact that we are in clinic in [place] today I would not be able to know for sure. I did assure the pt that first thing in the morning I would check the message basket and make her aware once papers were faxed. Pt verbalized understanding and agreement.

## 2018-02-27 NOTE — TELEPHONE ENCOUNTER
----- Message from Cj Ward sent at 2/27/2018 10:18 AM CST -----  Contact: self   Patient need you to fill out his return back to work form today if patient don't received this he will no longer have a job please call back at [phone]

## 2018-02-28 ENCOUNTER — TELEPHONE (OUTPATIENT)
Dept: PODIATRY | Facility: CLINIC | Age: 53
End: 2018-02-28

## 2018-02-28 NOTE — TELEPHONE ENCOUNTER
----- Message from Frida Robin sent at 2/28/2018  9:58 AM CST -----  Contact: Self  Patient needs to speak to the nurse because her return to work letter was sent but there was no date on it   Please refax to  [phone]    Please put the date to return and sign   Patient needs a return call when this is done so she can call he manager [phone]

## 2018-03-02 ENCOUNTER — OFFICE VISIT (OUTPATIENT)
Dept: PODIATRY | Facility: CLINIC | Age: 53
End: 2018-03-02
Payer: COMMERCIAL

## 2018-03-02 VITALS — WEIGHT: 293 LBS | HEIGHT: 64 IN | BODY MASS INDEX: 50.02 KG/M2

## 2018-03-02 DIAGNOSIS — L97.422 CHRONIC ULCER OF HEEL, LEFT, WITH FAT LAYER EXPOSED: ICD-10-CM

## 2018-03-02 DIAGNOSIS — Z98.890 POST-OPERATIVE STATE: Primary | ICD-10-CM

## 2018-03-02 PROCEDURE — 99999 PR PBB SHADOW E&M-EST. PATIENT-LVL III: CPT | Mod: PBBFAC,,, | Performed by: PODIATRIST

## 2018-03-02 PROCEDURE — 99024 POSTOP FOLLOW-UP VISIT: CPT | Mod: S$GLB,,, | Performed by: PODIATRIST

## 2018-03-02 NOTE — PROGRESS NOTES
"Subjective:      Patient ID: Brittany Patricio is a 52 y.o. female.    Chief Complaint: Post-op Evaluation (wound check)    12 weeks s/p posterior heel surgery with SAMIR left.  nwb in bk fx boot.  Pain minimal.  Denies signs infection.  Dressing wound from dehiscence daily with santyl, border gauze.  Fall 1/27/2017 with "pop" to heel area.  Sudden onset, improved since, aggravated with pressure.  Continues dressings, multipodis boot, knee walker nwb left.    Negative xrays and US left achilles.    Review of Systems   Constitution: Negative for chills, diaphoresis, fever, malaise/fatigue and night sweats.   Cardiovascular: Negative for claudication, cyanosis, leg swelling and syncope.   Skin: Positive for poor wound healing. Negative for color change, dry skin, nail changes, rash, suspicious lesions and unusual hair distribution.   Musculoskeletal: Negative for falls, joint pain, joint swelling, muscle cramps, muscle weakness and stiffness.   Gastrointestinal: Negative for constipation, diarrhea, nausea and vomiting.   Neurological: Negative for brief paralysis, disturbances in coordination, focal weakness, numbness, paresthesias, sensory change and tremors.           Objective:      Physical Exam   Cardiovascular:   Pulses:       Dorsalis pedis pulses are 2+ on the right side, and 2+ on the left side.        Posterior tibial pulses are 2+ on the right side, and 2+ on the left side.   DP and PT pulses 2/4 bilateral, capillary refill 3 seconds all toes/distal feet, all toes/both feet warm to touch.  Negative lymphadenopathy bilateral popliteal fossa and tarsal tunnel.  Negavie lower extremity edema bilateral.    Negative Dyer test and Heaven sign right and left lower extremities.       Musculoskeletal:        Left ankle: Normal. No tenderness. Achilles tendon exhibits no pain, no defect and normal Mendez's test results.   Good position and passive range of motion for this phase post op.       Lymphadenopathy: "   Negative lymphadenopathy bilateral popliteal fossa and tarsal tunnel.  Negative lymphangitic streaking bilateral foot/ankle bilateral.     Neurological:   Negative tinel sign to percussion sural, superficial peroneal, deep peroneal, saphenous, and posterior tibial nerves right and left ankles and feet.    Negative allodynia both feet.   Skin:   Wound:  Distal posterior heel incision left foot    Size:    Pre:[id number]      Base:  Tan stable eschar without drainage.  No pus, tracking, fluctuance, malodor, or cardinal signs infection.    Borders:   flat, pink, blanchable skin edges without undermining.      Otherwise, Skin is normal age and health appropriate color, turgor, texture, and temperature bilateral lower extremities without ulceration, hyperpigmentation, discoloration, masses nodules or cords palpated.  No ecchymosis, erythema, edema, or cardinal signs of infection bilateral lower extremities.               Assessment:       Encounter Diagnoses   Name Primary?    Post-operative state Yes    Chronic ulcer of heel, left, with fat layer exposed          Plan:       Brittany was seen today for post-op evaluation.    Diagnoses and all orders for this visit:    Post-operative state    Chronic ulcer of heel, left, with fat layer exposed      I counseled the patient on her conditions, their implications and medical management.    Dress wound all times with bordered gauze and aperture.     Rx multipodis heel offload boot.  Remove 3 x daily for ankle alphabet without resistance or WB at any time.    Dressed wound with mepilex border left heel and aperture to offload.    Begin to bear weight little by little. Continue gradual return to wb in fx boot left all times.    Work activity to tolerance in fx boot.  Minimal weight bearing with walker.        Follow-up in about 2 weeks (around 3/16/2018).

## 2018-03-16 ENCOUNTER — OFFICE VISIT (OUTPATIENT)
Dept: PODIATRY | Facility: CLINIC | Age: 53
End: 2018-03-16
Payer: COMMERCIAL

## 2018-03-16 VITALS — WEIGHT: 293 LBS | BODY MASS INDEX: 50.02 KG/M2 | HEIGHT: 64 IN

## 2018-03-16 DIAGNOSIS — L97.422 CHRONIC ULCER OF HEEL, LEFT, WITH FAT LAYER EXPOSED: Primary | ICD-10-CM

## 2018-03-16 PROCEDURE — 99212 OFFICE O/P EST SF 10 MIN: CPT | Mod: S$GLB,,, | Performed by: PODIATRIST

## 2018-03-16 PROCEDURE — 99999 PR PBB SHADOW E&M-EST. PATIENT-LVL III: CPT | Mod: PBBFAC,,, | Performed by: PODIATRIST

## 2018-03-16 NOTE — PROGRESS NOTES
"Subjective:      Patient ID: Brittany Patricio is a 52 y.o. female.    Chief Complaint: Post-op Evaluation (Left )    12 weeks s/p posterior heel surgery with SAMIR left.  nwb in bk fx boot.  Pain minimal.  Denies signs infection.  Dressing wound from dehiscence daily with santyl, border gauze.  Fall 1/27/2017 with "pop" to heel area.  Sudden onset, improved since, aggravated with pressure.  Continues dressings, multipodis boot, knee walker nwb left.    Negative xrays and US left achilles.    Review of Systems   Constitution: Negative for chills, diaphoresis, fever, malaise/fatigue and night sweats.   Cardiovascular: Negative for claudication, cyanosis, leg swelling and syncope.   Skin: Positive for poor wound healing. Negative for color change, dry skin, nail changes, rash, suspicious lesions and unusual hair distribution.   Musculoskeletal: Negative for falls, joint pain, joint swelling, muscle cramps, muscle weakness and stiffness.   Gastrointestinal: Negative for constipation, diarrhea, nausea and vomiting.   Neurological: Negative for brief paralysis, disturbances in coordination, focal weakness, numbness, paresthesias, sensory change and tremors.           Objective:      Physical Exam   Cardiovascular:   Pulses:       Dorsalis pedis pulses are 2+ on the right side, and 2+ on the left side.        Posterior tibial pulses are 2+ on the right side, and 2+ on the left side.   DP and PT pulses 2/4 bilateral, capillary refill 3 seconds all toes/distal feet, all toes/both feet warm to touch.  Negative lymphadenopathy bilateral popliteal fossa and tarsal tunnel.  Negavie lower extremity edema bilateral.    Negative Dyer test and Heaven sign right and left lower extremities.       Musculoskeletal:        Left ankle: Normal. No tenderness. Achilles tendon exhibits no pain, no defect and normal Mendez's test results.   Good position and passive range of motion for this phase post op.       Lymphadenopathy:   Negative " lymphadenopathy bilateral popliteal fossa and tarsal tunnel.  Negative lymphangitic streaking bilateral foot/ankle bilateral.     Neurological:   Negative tinel sign to percussion sural, superficial peroneal, deep peroneal, saphenous, and posterior tibial nerves right and left ankles and feet.    Negative allodynia both feet.   Skin:   Wound:  Distal posterior heel incision left foot    Size:    Pre:[id number]      Base:  Tan stable eschar without drainage.  No pus, tracking, fluctuance, malodor, or cardinal signs infection.    Borders:   flat, pink, blanchable skin edges without undermining.      Otherwise, Skin is normal age and health appropriate color, turgor, texture, and temperature bilateral lower extremities without ulceration, hyperpigmentation, discoloration, masses nodules or cords palpated.  No ecchymosis, erythema, edema, or cardinal signs of infection bilateral lower extremities.               Assessment:       Encounter Diagnosis   Name Primary?    Chronic ulcer of heel, left, with fat layer exposed Yes         Plan:       Brittany was seen today for post-op evaluation.    Diagnoses and all orders for this visit:    Chronic ulcer of heel, left, with fat layer exposed      I counseled the patient on her conditions, their implications and medical management.    Dress wound all times with bordered gauze and aperture.     Fill Rx multipodis heel offload boot.  Remove 3 x daily for ankle alphabet without resistance or WB at any time.    Dressed wound with mepilex border left heel and aperture to offload.    Begin to bear weight little by little. Continue gradual return to wb in fx boot left all times.    Work activity to tolerance in fx boot.  Minimal weight bearing with walker.        No Follow-up on file.

## 2018-04-04 ENCOUNTER — OFFICE VISIT (OUTPATIENT)
Dept: PODIATRY | Facility: CLINIC | Age: 53
End: 2018-04-04
Payer: COMMERCIAL

## 2018-04-04 VITALS — BODY MASS INDEX: 50.02 KG/M2 | WEIGHT: 293 LBS | HEIGHT: 64 IN

## 2018-04-04 DIAGNOSIS — Z98.890 POST-OPERATIVE STATE: ICD-10-CM

## 2018-04-04 DIAGNOSIS — L97.422 CHRONIC ULCER OF HEEL, LEFT, WITH FAT LAYER EXPOSED: Primary | ICD-10-CM

## 2018-04-04 PROCEDURE — 99999 PR PBB SHADOW E&M-EST. PATIENT-LVL III: CPT | Mod: PBBFAC,,, | Performed by: PODIATRIST

## 2018-04-04 PROCEDURE — 99212 OFFICE O/P EST SF 10 MIN: CPT | Mod: S$GLB,,, | Performed by: PODIATRIST

## 2018-04-04 NOTE — PROGRESS NOTES
"Subjective:      Patient ID: Brittany Patricio is a 52 y.o. female.    Chief Complaint: Post-op Evaluation (Left)    12 weeks s/p posterior heel surgery with SAMIR left.  nwb in bk fx boot.  Pain minimal.  Denies signs infection.  Dressing wound from dehiscence daily with santyl, border gauze.  Fall 1/27/2017 with "pop" to heel area.  Sudden onset, improved since, aggravated with pressure.  Continues dressings, multipodis boot, knee walker nwb left.    Negative xrays and US left achilles.    Review of Systems   Constitution: Negative for chills, diaphoresis, fever, malaise/fatigue and night sweats.   Cardiovascular: Negative for claudication, cyanosis, leg swelling and syncope.   Skin: Positive for poor wound healing. Negative for color change, dry skin, nail changes, rash, suspicious lesions and unusual hair distribution.   Musculoskeletal: Negative for falls, joint pain, joint swelling, muscle cramps, muscle weakness and stiffness.   Gastrointestinal: Negative for constipation, diarrhea, nausea and vomiting.   Neurological: Negative for brief paralysis, disturbances in coordination, focal weakness, numbness, paresthesias, sensory change and tremors.           Objective:      Physical Exam   Cardiovascular:   Pulses:       Dorsalis pedis pulses are 2+ on the right side, and 2+ on the left side.        Posterior tibial pulses are 2+ on the right side, and 2+ on the left side.   DP and PT pulses 2/4 bilateral, capillary refill 3 seconds all toes/distal feet, all toes/both feet warm to touch.  Negative lymphadenopathy bilateral popliteal fossa and tarsal tunnel.  Negavie lower extremity edema bilateral.    Negative Dyer test and Heavne sign right and left lower extremities.       Musculoskeletal:        Left ankle: Normal. No tenderness. Achilles tendon exhibits no pain, no defect and normal Mendez's test results.   Good position and passive range of motion.  MMT 5/5 left posterior groups.     Lymphadenopathy: "   Negative lymphadenopathy bilateral popliteal fossa and tarsal tunnel.  Negative lymphangitic streaking bilateral foot/ankle bilateral.     Neurological:   Negative tinel sign to percussion sural, superficial peroneal, deep peroneal, saphenous, and posterior tibial nerves right and left ankles and feet.    Negative allodynia both feet.   Skin:   Wound posterior heel left closed today, epithelialized  without ulceration, drainage, pus, tracking, fluctuance, malodor, or cardinal signs infection.      Otherwise, Skin is normal age and health appropriate color, turgor, texture, and temperature bilateral lower extremities without ulceration, hyperpigmentation, discoloration, masses nodules or cords palpated.  No ecchymosis, erythema, edema, or cardinal signs of infection bilateral lower extremities.               Assessment:       Encounter Diagnoses   Name Primary?    Chronic ulcer of heel, left, with fat layer exposed Yes    Post-operative state          Plan:       Brittany was seen today for post-op evaluation.    Diagnoses and all orders for this visit:    Chronic ulcer of heel, left, with fat layer exposed    Post-operative state      I counseled the patient on her conditions, their implications and medical management.    Dress wound all times with bordered gauze and aperture.     Fill Rx multipodis heel offload boot.  Remove 3 x daily for ankle alphabet without resistance or WB at any time.    Dressed wound with mepilex border left heel and aperture to offload.    Begin to bear weight little by little. Continue gradual return to wb in fx boot left all times.    Work activity to tolerance in fx boot.  Minimal weight bearing with walker.        Follow-up in about 2 weeks (around 4/18/2018).

## 2018-04-18 ENCOUNTER — OFFICE VISIT (OUTPATIENT)
Dept: PODIATRY | Facility: CLINIC | Age: 53
End: 2018-04-18
Payer: COMMERCIAL

## 2018-04-18 VITALS — BODY MASS INDEX: 50.02 KG/M2 | HEIGHT: 64 IN | WEIGHT: 293 LBS

## 2018-04-18 DIAGNOSIS — T81.31XD DISRUPTION OR DEHISCENCE OF CLOSURE OF SKIN, SUBSEQUENT ENCOUNTER: ICD-10-CM

## 2018-04-18 DIAGNOSIS — Z98.890 POST-OPERATIVE STATE: Primary | ICD-10-CM

## 2018-04-18 PROCEDURE — 99212 OFFICE O/P EST SF 10 MIN: CPT | Mod: S$GLB,,, | Performed by: PODIATRIST

## 2018-04-18 PROCEDURE — 99999 PR PBB SHADOW E&M-EST. PATIENT-LVL III: CPT | Mod: PBBFAC,,, | Performed by: PODIATRIST

## 2018-04-18 NOTE — PROGRESS NOTES
Subjective:      Patient ID: Brittany Patricio is a 52 y.o. female.    Chief Complaint: Foot Swelling (left foot ) and Foot Pain (left foot )    s/p posterior heel surgery with SAMIR left.  nwb in bk fx boot.  Pain minimal.  Denies signs infection.  Ambulating in athletic shoes without difficulty or pain.  Back at work with only complaint end of day swelling managed with elevation.    Negative xrays and US left achilles.    Review of Systems   Constitution: Negative for chills, diaphoresis, fever, malaise/fatigue and night sweats.   Cardiovascular: Negative for claudication, cyanosis, leg swelling and syncope.   Skin: Negative for color change, dry skin, nail changes, poor wound healing, rash, suspicious lesions and unusual hair distribution.   Musculoskeletal: Negative for falls, joint pain, joint swelling, muscle cramps, muscle weakness and stiffness.   Gastrointestinal: Negative for constipation, diarrhea, nausea and vomiting.   Neurological: Negative for brief paralysis, disturbances in coordination, focal weakness, numbness, paresthesias, sensory change and tremors.           Objective:      Physical Exam   Cardiovascular:   Pulses:       Dorsalis pedis pulses are 2+ on the right side, and 2+ on the left side.        Posterior tibial pulses are 2+ on the right side, and 2+ on the left side.   DP and PT pulses 2/4 bilateral, capillary refill 3 seconds all toes/distal feet, all toes/both feet warm to touch.  Negative lymphadenopathy bilateral popliteal fossa and tarsal tunnel.  Negavie lower extremity edema bilateral.    Negative Dyer test and Heaven sign right and left lower extremities.       Musculoskeletal:        Left ankle: Normal. No tenderness. Achilles tendon exhibits no pain, no defect and normal Mendez's test results.   Good position and passive range of motion.  MMT 5/5 left posterior groups.    No pain to palpation bilateral feet and legs.    Normal angle, base, station of gait. All ten toes without  clubbing, cyanosis, or signs of ischemia.        Range of motion, stability, muscle strength, and muscle tone normal bilateral feet and legs.     Lymphadenopathy:   Negative lymphadenopathy bilateral popliteal fossa and tarsal tunnel.  Negative lymphangitic streaking bilateral foot/ankle bilateral.     Neurological:   Negative tinel sign to percussion sural, superficial peroneal, deep peroneal, saphenous, and posterior tibial nerves right and left ankles and feet.    Negative allodynia both feet.   Skin:   Wound posterior heel left remains closed today, epithelialized  without ulceration, drainage, pus, tracking, fluctuance, malodor, or cardinal signs infection.      Otherwise, Skin is normal age and health appropriate color, turgor, texture, and temperature bilateral lower extremities without ulceration, hyperpigmentation, discoloration, masses nodules or cords palpated.  No ecchymosis, erythema, edema, or cardinal signs of infection bilateral lower extremities.               Assessment:       Encounter Diagnoses   Name Primary?    Post-operative state Yes    Disruption or dehiscence of closure of skin, subsequent encounter          Plan:       Brittany was seen today for foot swelling and foot pain.    Diagnoses and all orders for this visit:    Post-operative state    Disruption or dehiscence of closure of skin, subsequent encounter      I counseled the patient on her conditions, their implications and medical management.     continue activity to tolerance in shoes of choice.      RIICE prn for swelling.    Inspect feet multiple times daily for signs of occurrence/recurrence ulceration.      Follow-up if symptoms worsen or fail to improve.

## 2018-05-05 ENCOUNTER — OFFICE VISIT (OUTPATIENT)
Dept: URGENT CARE | Facility: CLINIC | Age: 53
End: 2018-05-05
Payer: COMMERCIAL

## 2018-05-05 VITALS
RESPIRATION RATE: 18 BRPM | SYSTOLIC BLOOD PRESSURE: 130 MMHG | OXYGEN SATURATION: 96 % | TEMPERATURE: 98 F | HEART RATE: 98 BPM | DIASTOLIC BLOOD PRESSURE: 92 MMHG | BODY MASS INDEX: 50.02 KG/M2 | WEIGHT: 293 LBS | HEIGHT: 64 IN

## 2018-05-05 DIAGNOSIS — S70.01XA CONTUSION OF RIGHT HIP, INITIAL ENCOUNTER: Primary | ICD-10-CM

## 2018-05-05 PROCEDURE — 3080F DIAST BP >= 90 MM HG: CPT | Mod: CPTII,S$GLB,, | Performed by: FAMILY MEDICINE

## 2018-05-05 PROCEDURE — 3075F SYST BP GE 130 - 139MM HG: CPT | Mod: CPTII,S$GLB,, | Performed by: FAMILY MEDICINE

## 2018-05-05 PROCEDURE — 99213 OFFICE O/P EST LOW 20 MIN: CPT | Mod: S$GLB,,, | Performed by: FAMILY MEDICINE

## 2018-05-05 PROCEDURE — 3008F BODY MASS INDEX DOCD: CPT | Mod: CPTII,S$GLB,, | Performed by: FAMILY MEDICINE

## 2018-05-05 RX ORDER — IBUPROFEN 800 MG/1
800 TABLET ORAL EVERY 8 HOURS PRN
Qty: 30 TABLET | Refills: 1 | Status: SHIPPED | OUTPATIENT
Start: 2018-05-05 | End: 2019-05-05
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
## 2018-09-21 ENCOUNTER — TELEPHONE (OUTPATIENT)
Dept: ADMINISTRATIVE | Facility: OTHER | Age: 53
End: 2018-09-21

## 2018-09-21 ENCOUNTER — TELEPHONE (OUTPATIENT)
Dept: PODIATRY | Facility: CLINIC | Age: 53
End: 2018-09-21
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
## 2018-09-24 ENCOUNTER — TELEPHONE (OUTPATIENT)
Dept: PODIATRY | Facility: CLINIC | Age: 53
End: 2018-09-24

## 2018-09-24 ENCOUNTER — OFFICE VISIT (OUTPATIENT)
Dept: PODIATRY | Facility: CLINIC | Age: 53
End: 2018-09-24
Payer: COMMERCIAL

## 2018-09-24 VITALS
SYSTOLIC BLOOD PRESSURE: 132 MMHG | HEART RATE: 91 BPM | HEIGHT: 64 IN | BODY MASS INDEX: 50.02 KG/M2 | DIASTOLIC BLOOD PRESSURE: 84 MMHG | WEIGHT: 293 LBS

## 2018-09-24 DIAGNOSIS — M84.375A METATARSAL STRESS FRACTURE, LEFT, INITIAL ENCOUNTER: Primary | ICD-10-CM

## 2018-09-24 DIAGNOSIS — M79.672 FOOT PAIN, LEFT: ICD-10-CM

## 2018-09-24 PROCEDURE — 3079F DIAST BP 80-89 MM HG: CPT | Mod: CPTII,S$GLB,, | Performed by: PODIATRIST

## 2018-09-24 PROCEDURE — 3075F SYST BP GE 130 - 139MM HG: CPT | Mod: CPTII,S$GLB,, | Performed by: PODIATRIST

## 2018-09-24 PROCEDURE — 99213 OFFICE O/P EST LOW 20 MIN: CPT | Mod: S$GLB,,, | Performed by: PODIATRIST

## 2018-09-24 PROCEDURE — 99999 PR PBB SHADOW E&M-EST. PATIENT-LVL III: CPT | Mod: PBBFAC,,, | Performed by: PODIATRIST

## 2018-09-24 PROCEDURE — 3008F BODY MASS INDEX DOCD: CPT | Mod: CPTII,S$GLB,, | Performed by: PODIATRIST

## 2018-09-24 RX ORDER — TRAMADOL HYDROCHLORIDE 50 MG/1
50 TABLET ORAL EVERY 8 HOURS PRN
Qty: 20 TABLET | Refills: 0 | Status: SHIPPED | OUTPATIENT
Start: 2018-09-24 | End: 2018-10-24
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
## 2018-10-24 ENCOUNTER — HOSPITAL ENCOUNTER (OUTPATIENT)
Dept: RADIOLOGY | Facility: HOSPITAL | Age: 53
Discharge: HOME | End: 2018-10-24
Attending: PODIATRIST
Payer: COMMERCIAL

## 2018-10-24 ENCOUNTER — OFFICE VISIT (OUTPATIENT)
Dept: PODIATRY | Facility: CLINIC | Age: 53
End: 2018-10-24
Payer: COMMERCIAL

## 2018-10-24 VITALS
DIASTOLIC BLOOD PRESSURE: 87 MMHG | HEIGHT: 64 IN | HEART RATE: 102 BPM | BODY MASS INDEX: 50.02 KG/M2 | WEIGHT: 293 LBS | SYSTOLIC BLOOD PRESSURE: 131 MMHG

## 2018-10-24 DIAGNOSIS — M79.672 FOOT PAIN, LEFT: ICD-10-CM

## 2018-10-24 DIAGNOSIS — S92.345D CLOSED NONDISPLACED FRACTURE OF FOURTH METATARSAL BONE OF LEFT FOOT WITH ROUTINE HEALING, SUBSEQUENT ENCOUNTER: ICD-10-CM

## 2018-10-24 DIAGNOSIS — S92.345D CLOSED NONDISPLACED FRACTURE OF FOURTH METATARSAL BONE OF LEFT FOOT WITH ROUTINE HEALING, SUBSEQUENT ENCOUNTER: Primary | ICD-10-CM

## 2018-10-24 PROCEDURE — 3075F SYST BP GE 130 - 139MM HG: CPT | Mod: CPTII,S$GLB,, | Performed by: PODIATRIST

## 2018-10-24 PROCEDURE — 3079F DIAST BP 80-89 MM HG: CPT | Mod: CPTII,S$GLB,, | Performed by: PODIATRIST

## 2018-10-24 PROCEDURE — 73630 X-RAY EXAM OF FOOT: CPT | Mod: TC,LT

## 2018-10-24 PROCEDURE — 99999 PR PBB SHADOW E&M-EST. PATIENT-LVL III: CPT | Mod: PBBFAC,,, | Performed by: PODIATRIST

## 2018-10-24 PROCEDURE — 99213 OFFICE O/P EST LOW 20 MIN: CPT | Mod: S$GLB,,, | Performed by: PODIATRIST

## 2018-10-24 PROCEDURE — 73630 X-RAY EXAM OF FOOT: CPT | Mod: 26,LT,, | Performed by: RADIOLOGY

## 2018-10-24 PROCEDURE — 3008F BODY MASS INDEX DOCD: CPT | Mod: CPTII,S$GLB,, | Performed by: PODIATRIST
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
## 2018-11-21 ENCOUNTER — OFFICE VISIT (OUTPATIENT)
Dept: PODIATRY | Facility: CLINIC | Age: 53
End: 2018-11-21
Payer: COMMERCIAL

## 2018-11-21 VITALS
BODY MASS INDEX: 50.02 KG/M2 | DIASTOLIC BLOOD PRESSURE: 92 MMHG | HEIGHT: 64 IN | SYSTOLIC BLOOD PRESSURE: 138 MMHG | WEIGHT: 293 LBS | HEART RATE: 99 BPM

## 2018-11-21 DIAGNOSIS — S92.345S CLOSED NONDISPLACED FRACTURE OF FOURTH METATARSAL BONE OF LEFT FOOT, SEQUELA: Primary | ICD-10-CM

## 2018-11-21 PROCEDURE — 99999 PR PBB SHADOW E&M-EST. PATIENT-LVL III: CPT | Mod: PBBFAC,,, | Performed by: PODIATRIST

## 2018-11-21 PROCEDURE — 3008F BODY MASS INDEX DOCD: CPT | Mod: CPTII,S$GLB,, | Performed by: PODIATRIST

## 2018-11-21 PROCEDURE — 3075F SYST BP GE 130 - 139MM HG: CPT | Mod: CPTII,S$GLB,, | Performed by: PODIATRIST

## 2018-11-21 PROCEDURE — 99212 OFFICE O/P EST SF 10 MIN: CPT | Mod: S$GLB,,, | Performed by: PODIATRIST

## 2018-11-21 PROCEDURE — 3080F DIAST BP >= 90 MM HG: CPT | Mod: CPTII,S$GLB,, | Performed by: PODIATRIST
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
## 2019-07-24 ENCOUNTER — TELEPHONE (OUTPATIENT)
Dept: OBSTETRICS AND GYNECOLOGY | Facility: CLINIC | Age: 54
End: 2019-07-24

## 2019-07-24 ENCOUNTER — HOSPITAL ENCOUNTER (EMERGENCY)
Facility: HOSPITAL | Age: 54
Discharge: HOME | End: 2019-07-24
Attending: EMERGENCY MEDICINE
Payer: COMMERCIAL

## 2019-07-24 VITALS
DIASTOLIC BLOOD PRESSURE: 79 MMHG | OXYGEN SATURATION: 95 % | RESPIRATION RATE: 18 BRPM | HEIGHT: 64 IN | HEART RATE: 88 BPM | BODY MASS INDEX: 50.02 KG/M2 | SYSTOLIC BLOOD PRESSURE: 123 MMHG | TEMPERATURE: 99 F | WEIGHT: 293 LBS

## 2019-07-24 DIAGNOSIS — N95.0 POSTMENOPAUSAL VAGINAL BLEEDING: Primary | ICD-10-CM

## 2019-07-24 LAB
BACTERIA GENITAL QL WET PREP: ABNORMAL
C TRACH DNA SPEC QL NAA+PROBE: NOT DETECTED
CLUE CELLS VAG QL WET PREP: ABNORMAL
FILAMENT FUNGI VAG WET PREP-#/AREA: ABNORMAL
N GONORRHOEA DNA SPEC QL NAA+PROBE: NOT DETECTED
SPECIMEN SOURCE: ABNORMAL
T VAGINALIS GENITAL QL WET PREP: ABNORMAL
WBC #/AREA VAG WET PREP: ABNORMAL
YEAST GENITAL QL WET PREP: ABNORMAL

## 2019-07-24 PROCEDURE — 99284 EMERGENCY DEPT VISIT MOD MDM: CPT | Mod: ,,, | Performed by: PHYSICIAN ASSISTANT

## 2019-07-24 PROCEDURE — 99284 EMERGENCY DEPT VISIT MOD MDM: CPT

## 2019-07-24 PROCEDURE — 87491 CHLMYD TRACH DNA AMP PROBE: CPT

## 2019-07-24 PROCEDURE — 87210 SMEAR WET MOUNT SALINE/INK: CPT

## 2019-07-24 PROCEDURE — 99284 PR EMERGENCY DEPT VISIT,LEVEL IV: ICD-10-PCS | Mod: ,,, | Performed by: PHYSICIAN ASSISTANT

## 2019-07-24 RX ORDER — DICYCLOMINE HYDROCHLORIDE 20 MG/1
20 TABLET ORAL 2 TIMES DAILY PRN
Qty: 10 TABLET | Refills: 0 | Status: SHIPPED | OUTPATIENT
Start: 2019-07-24 | End: 2022-03-05

## 2019-07-24 RX ORDER — NAPROXEN 500 MG/1
500 TABLET ORAL 2 TIMES DAILY WITH MEALS
Qty: 14 TABLET | Refills: 0 | Status: SHIPPED | OUTPATIENT
Start: 2019-07-24

## 2019-07-24 NOTE — ED NOTES
Patient identifiers verified and correct for Ms Patricio  C/C: vaginal bleeding x 2 SEE NN  APPEARANCE: awake and alert in NAD.  SKIN: warm, dry and intact. No breakdown or bruising.  MUSCULOSKELETAL: Patient moving all extremities spontaneously, no obvious swelling or deformities noted. Ambulates independently.  RESPIRATORY: Denies shortness of breath.Respirations unlabored.   CARDIAC: Denies CP, 2+ distal pulses; no peripheral edema  ABDOMEN: S/ND/NT, Denies nausea  : voids spontaneously, denies difficulty  Neurologic: AAO x 4; follows commands equal strength in all extremities; denies numbness/tingling. Denies dizziness Denies weakness, states vaginal bleedin gdark brown to bright red with clot, denies SOB

## 2019-07-24 NOTE — TELEPHONE ENCOUNTER
New pt requesting to see any provider in our clinic, states she has not had a period for 14 yrs and 2 days she started having severe pain pain, was not able to sleep and in the morning she started having heavy bleeding. Pt states that it stopped as soon as it started. Then last night she had the same symptoms, severe back pain, couldn't sleep and this morning she started bleeding but now with clots and it was worse than the first time. She is requesting if anyone can see her today or possibly tomorrow or any suggestions for pt. Please call and advise, thank you.

## 2019-07-24 NOTE — TELEPHONE ENCOUNTER
Pt states she started with severe back pain and PMB for the past 2 days.  She has not had a period for 14 years.  Informed pt that there is no availability in the next 2 days, she will be going out of town within the next week for her father's possible death.  Offered 8/5 with an US to follow but she declined appt in anticipation of being out of town.  She will go to the ER and call back to f/u

## 2019-07-24 NOTE — ED TRIAGE NOTES
Patient states vaginal bleeding onset 2 days PTA accompanied by chronic back pain. States brown, red drainage with large clot. Also states cramping pain in lower abdomen.

## 2019-07-25 NOTE — ED PROVIDER NOTES
Encounter Date: 2019    SCRIBE #1 NOTE: I Lynn Ballesterosang, am scribing for, and in the presence of, Kurt Ortiz III, MD. the APC attestation.       History     Chief Complaint   Patient presents with    Vaginal Bleeding     menopause for 14 years back pain and abdominal pain with clots     54-year-old female presents to the ED for evaluation of vaginal bleeding.  Patient reports intermittent vaginal bleeding, pain across the low back, pelvic cramping over the past 2 days.  Patient has not had a menstrual cycle for 14 years.  Patient has chronic low back pain, but states that this pain is slightly different than her chronic pain. Denies urinary symptoms, fever.  She has not seen a gynecologist in the past 8 or 9 years.         Review of patient's allergies indicates:   Allergen Reactions    Iodine and iodide containing products Rash     Past Medical History:   Diagnosis Date    Hypertension     Ruptured disc, thoracic      Past Surgical History:   Procedure Laterality Date    ACHILLES TENDON SURGERY Left      SECTION      EXCISION-BONE SPUR-FOOT Left 2017    Performed by Orion Mcdermott D[facility] at [facility] OR 1ST FLR    HEMORRHOID SURGERY      LENGTHENING-TENDON-ACHILLES (SAMIR) Left 2017    Performed by Orion Mcdermott DPM at [facility] OR 1ST FLR    REFRACTIVE SURGERY      TONSILLECTOMY, ADENOIDECTOMY       Family History   Problem Relation Age of Onset    Hypertension Mother     Hypertension Father     COPD Father      Social History     Tobacco Use    Smoking status: Former Smoker    Smokeless tobacco: Never Used    Tobacco comment: quit in    Substance Use Topics    Alcohol use: Not Currently     Comment: occassionally    Drug use: No     Review of Systems   Constitutional: Negative for fever.   HENT: Negative for sore throat.    Respiratory: Negative for shortness of breath.    Cardiovascular: Negative for chest pain.   Gastrointestinal: Negative for nausea.   Genitourinary:  Positive for pelvic pain and vaginal bleeding. Negative for dysuria.   Musculoskeletal: Positive for back pain.   Skin: Negative for rash.   Neurological: Negative for weakness.   Hematological: Does not bruise/bleed easily.       Physical Exam     Initial Vitals [07/24/19 1704]   BP Pulse Resp Temp SpO2   (!) 185/105 93 18 98.6 °F (37 °C) 97 %      MAP       --         Physical Exam    Nursing note and vitals reviewed.  Constitutional: She appears well-developed and well-nourished. She is not diaphoretic. She is Obese .  Non-toxic appearance. She does not appear ill. No distress.   HENT:   Head: Normocephalic and atraumatic.   Neck: Neck supple.   Cardiovascular: Normal rate and regular rhythm. Exam reveals no gallop and no friction rub.    No murmur heard.  Pulmonary/Chest: Effort normal and breath sounds normal. No accessory muscle usage. No tachypnea. No respiratory distress. She has no decreased breath sounds. She has no wheezes. She has no rhonchi. She has no rales.   Abdominal: She exhibits no distension.   Genitourinary: Uterus is not tender. Right adnexum displays no tenderness. Left adnexum displays no tenderness. There is bleeding in the vagina.   Genitourinary Comments: Dark brownish blood noted in the vaginal vault.  Difficult visualization of cervix due to body habitus.    Neurological: She is alert.   Skin: No rash noted.   Psychiatric: She has a normal mood and affect. Her behavior is normal.         ED Course   Procedures  Labs Reviewed   VAGINAL SCREEN - Abnormal; Notable for the following components:       Result Value    Clue Cells Rare (*)     WBC - Vaginal Screen Rare (*)     Bacteria - Vaginal Screen Many (*)     All other components within normal limits   C. TRACHOMATIS/N. GONORRHOEAE BY AMP DNA    Narrative:     Sources by Resulting Lab:->[facility]          Imaging Results    None          Medical Decision Making:   History:   Old Medical Records: I decided to obtain old medical  records.  Differential Diagnosis:   My differential diagnosis includes but is not limited to:  Malignancy, dysfunctional uterine bleeding, vaginal atrophy  Clinical Tests:   Lab Tests: Ordered  ED Management:  54-year-old postmenopausal female presents with vaginal bleeding.  Initial BP is elevated at 185/105, but repeat is within normal limits without intervention.  Afebrile.  No acute distress. Moderate amount of brownish blood noted in the vaginal vault.  Visualization of the cervix was difficult given patient's body habitus.  No significant tenderness on bimanual exam.  No masses palpated, however this also was difficult due to patient's body habitus.  Discussed obtaining ultrasound to further assess etiology of bleeding.  It was agreed that patient would obtain imaging as an outpatient.  Patient will follow up with gynecology in the next week for re-evaluation.  Will d/c instable condition. ED return precautions given.  Stable for discharge.               Scribe Attestation:   Scribe #1: I performed the above scribed service and the documentation accurately describes the services I performed. I attest to the accuracy of the note.    Attending Attestation:     Physician Attestation Statement for NP/PA:   I discussed this assessment and plan of this patient with the NP/PA, but I did not personally examine the patient. The face to face encounter was performed by the NP/PA.                     Clinical Impression:       ICD-10-CM ICD-9-CM   1. Postmenopausal vaginal bleeding N95.0 627.1         Disposition:   Disposition: Discharged  Condition: Stable                        Anny Kim PA-C  07/24/19 7950       Kurt Ortiz III, MD  07/27/19 2905

## 2020-05-26 ENCOUNTER — LAB VISIT (OUTPATIENT)
Dept: PRIMARY CARE CLINIC | Facility: CLINIC | Age: 55
End: 2020-05-26
Payer: COMMERCIAL

## 2020-05-26 DIAGNOSIS — R05.9 COUGH: Primary | ICD-10-CM

## 2020-05-26 PROCEDURE — U0003 INFECTIOUS AGENT DETECTION BY NUCLEIC ACID (DNA OR RNA); SEVERE ACUTE RESPIRATORY SYNDROME CORONAVIRUS 2 (SARS-COV-2) (CORONAVIRUS DISEASE [COVID-19]), AMPLIFIED PROBE TECHNIQUE, MAKING USE OF HIGH THROUGHPUT TECHNOLOGIES AS DESCRIBED BY CMS-2020-01-R: HCPCS

## 2020-05-28 LAB — SARS-COV-2 RNA RESP QL NAA+PROBE: NOT DETECTED

## 2022-03-04 ENCOUNTER — HOSPITAL ENCOUNTER (OUTPATIENT)
Facility: HOSPITAL | Age: 57
Discharge: HOME | End: 2022-03-06
Attending: EMERGENCY MEDICINE | Admitting: INTERNAL MEDICINE
Payer: COMMERCIAL

## 2022-03-04 DIAGNOSIS — J45.901 ASTHMA WITH ACUTE EXACERBATION, UNSPECIFIED ASTHMA SEVERITY, UNSPECIFIED WHETHER PERSISTENT: ICD-10-CM

## 2022-03-04 DIAGNOSIS — J18.9 PNEUMONIA OF RIGHT LOWER LOBE DUE TO INFECTIOUS ORGANISM: ICD-10-CM

## 2022-03-04 DIAGNOSIS — J45.909 ASTHMA, UNSPECIFIED ASTHMA SEVERITY, UNSPECIFIED WHETHER COMPLICATED, UNSPECIFIED WHETHER PERSISTENT: ICD-10-CM

## 2022-03-04 DIAGNOSIS — J45.51 SEVERE PERSISTENT ASTHMA WITH ACUTE EXACERBATION: ICD-10-CM

## 2022-03-04 DIAGNOSIS — R06.02 SOB (SHORTNESS OF BREATH): ICD-10-CM

## 2022-03-04 DIAGNOSIS — N17.9 AKI (ACUTE KIDNEY INJURY): Primary | ICD-10-CM

## 2022-03-04 LAB
CTP QC/QA: YES
SARS-COV-2 RDRP RESP QL NAA+PROBE: NEGATIVE

## 2022-03-04 PROCEDURE — 83605 ASSAY OF LACTIC ACID: CPT | Performed by: EMERGENCY MEDICINE

## 2022-03-04 PROCEDURE — 25000242 PHARM REV CODE 250 ALT 637 W/ HCPCS: Performed by: EMERGENCY MEDICINE

## 2022-03-04 PROCEDURE — 93005 ELECTROCARDIOGRAM TRACING: CPT

## 2022-03-04 PROCEDURE — 93010 EKG 12-LEAD: ICD-10-PCS | Mod: ,,, | Performed by: INTERNAL MEDICINE

## 2022-03-04 PROCEDURE — 99285 EMERGENCY DEPT VISIT HI MDM: CPT | Mod: 25

## 2022-03-04 PROCEDURE — 80053 COMPREHEN METABOLIC PANEL: CPT | Performed by: EMERGENCY MEDICINE

## 2022-03-04 PROCEDURE — 94640 AIRWAY INHALATION TREATMENT: CPT

## 2022-03-04 PROCEDURE — 86803 HEPATITIS C AB TEST: CPT | Performed by: EMERGENCY MEDICINE

## 2022-03-04 PROCEDURE — 99291 PR CRITICAL CARE, E/M 30-74 MINUTES: ICD-10-PCS | Mod: CS,,, | Performed by: EMERGENCY MEDICINE

## 2022-03-04 PROCEDURE — 93010 ELECTROCARDIOGRAM REPORT: CPT | Mod: ,,, | Performed by: INTERNAL MEDICINE

## 2022-03-04 PROCEDURE — 94761 N-INVAS EAR/PLS OXIMETRY MLT: CPT

## 2022-03-04 PROCEDURE — 99291 CRITICAL CARE FIRST HOUR: CPT | Mod: CS,,, | Performed by: EMERGENCY MEDICINE

## 2022-03-04 PROCEDURE — 85025 COMPLETE CBC W/AUTO DIFF WBC: CPT | Performed by: EMERGENCY MEDICINE

## 2022-03-04 PROCEDURE — 80047 BASIC METABLC PNL IONIZED CA: CPT

## 2022-03-04 PROCEDURE — 87389 HIV-1 AG W/HIV-1&-2 AB AG IA: CPT | Performed by: EMERGENCY MEDICINE

## 2022-03-04 PROCEDURE — U0002 COVID-19 LAB TEST NON-CDC: HCPCS | Performed by: EMERGENCY MEDICINE

## 2022-03-04 PROCEDURE — 84484 ASSAY OF TROPONIN QUANT: CPT | Performed by: EMERGENCY MEDICINE

## 2022-03-04 RX ORDER — AZITHROMYCIN 250 MG/1
500 TABLET, FILM COATED ORAL
Status: COMPLETED | OUTPATIENT
Start: 2022-03-05 | End: 2022-03-05

## 2022-03-04 RX ORDER — IPRATROPIUM BROMIDE AND ALBUTEROL SULFATE 2.5; .5 MG/3ML; MG/3ML
3 SOLUTION RESPIRATORY (INHALATION)
Status: COMPLETED | OUTPATIENT
Start: 2022-03-04 | End: 2022-03-04

## 2022-03-04 RX ADMIN — IPRATROPIUM BROMIDE AND ALBUTEROL SULFATE 3 ML: 2.5; .5 SOLUTION RESPIRATORY (INHALATION) at 11:03

## 2022-03-04 NOTE — LETTER
March 6, 2022         [address]  [facility] [address]  Phone: [phone]  Fax: [phone]       Patient: Brittany Patricio   YOB: 1965  Date of Visit: 03/06/2022    To Whom It May Concern:    Carl Patricio  was at [facility] on 03/06/2022. The patient may return to work/school on 3/8/2022 with no restrictions. If you have any questions or concerns, or if I can be of further assistance, please do not hesitate to contact me.    Sincerely,    Mayte Gilbert MD

## 2022-03-05 PROBLEM — R06.02 SHORTNESS OF BREATH: Status: ACTIVE | Noted: 2022-03-05

## 2022-03-05 PROBLEM — R05.8 PRODUCTIVE COUGH: Status: ACTIVE | Noted: 2022-03-05

## 2022-03-05 PROBLEM — R79.89 HIGH SERUM CREATINE: Status: RESOLVED | Noted: 2022-03-05 | Resolved: 2022-03-05

## 2022-03-05 PROBLEM — J45.909 ASTHMA: Status: ACTIVE | Noted: 2022-03-05

## 2022-03-05 PROBLEM — E66.01 MORBID OBESITY WITH BMI OF 50.0-59.9, ADULT: Status: ACTIVE | Noted: 2022-03-05

## 2022-03-05 PROBLEM — E11.9 TYPE 2 DIABETES MELLITUS, WITHOUT LONG-TERM CURRENT USE OF INSULIN: Status: ACTIVE | Noted: 2022-03-05

## 2022-03-05 PROBLEM — I10 ESSENTIAL HYPERTENSION: Status: ACTIVE | Noted: 2022-03-05

## 2022-03-05 PROBLEM — R79.89 HIGH SERUM CREATINE: Status: ACTIVE | Noted: 2022-03-05

## 2022-03-05 PROBLEM — N17.9 AKI (ACUTE KIDNEY INJURY): Status: ACTIVE | Noted: 2022-03-05

## 2022-03-05 PROBLEM — F41.9 ANXIETY: Status: ACTIVE | Noted: 2022-03-05

## 2022-03-05 LAB
ALBUMIN SERPL BCP-MCNC: 4 G/DL (ref 3.5–5.2)
ALLENS TEST: ABNORMAL
ALP SERPL-CCNC: 115 U/L (ref 55–135)
ALT SERPL W/O P-5'-P-CCNC: 28 U/L (ref 10–44)
ANION GAP SERPL CALC-SCNC: 17 MMOL/L (ref 8–16)
ANION GAP SERPL CALC-SCNC: 17 MMOL/L (ref 8–16)
AST SERPL-CCNC: 29 U/L (ref 10–40)
BASOPHILS # BLD AUTO: 0.05 K/UL (ref 0–0.2)
BASOPHILS NFR BLD: 0.4 % (ref 0–1.9)
BILIRUB SERPL-MCNC: 0.5 MG/DL (ref 0.1–1)
BNP SERPL-MCNC: 21 PG/ML (ref 0–99)
BUN SERPL-MCNC: 27 MG/DL (ref 6–20)
BUN SERPL-MCNC: 27 MG/DL (ref 6–20)
BUN SERPL-MCNC: 30 MG/DL (ref 6–30)
CALCIUM SERPL-MCNC: 9.5 MG/DL (ref 8.7–10.5)
CALCIUM SERPL-MCNC: 9.9 MG/DL (ref 8.7–10.5)
CHLORIDE SERPL-SCNC: 101 MMOL/L (ref 95–110)
CHLORIDE SERPL-SCNC: 102 MMOL/L (ref 95–110)
CHLORIDE SERPL-SCNC: 103 MMOL/L (ref 95–110)
CO2 SERPL-SCNC: 16 MMOL/L (ref 23–29)
CO2 SERPL-SCNC: 21 MMOL/L (ref 23–29)
CREAT SERPL-MCNC: 1.3 MG/DL (ref 0.5–1.4)
CREAT SERPL-MCNC: 1.4 MG/DL (ref 0.5–1.4)
CREAT SERPL-MCNC: 1.8 MG/DL (ref 0.5–1.4)
DIFFERENTIAL METHOD: ABNORMAL
EOSINOPHIL # BLD AUTO: 0.3 K/UL (ref 0–0.5)
EOSINOPHIL NFR BLD: 2.1 % (ref 0–8)
ERYTHROCYTE [DISTWIDTH] IN BLOOD BY AUTOMATED COUNT: 13.7 % (ref 11.5–14.5)
EST. GFR  (AFRICAN AMERICAN): 35.8 ML/MIN/1.73 M^2
EST. GFR  (AFRICAN AMERICAN): 48.5 ML/MIN/1.73 M^2
EST. GFR  (NON AFRICAN AMERICAN): 31 ML/MIN/1.73 M^2
EST. GFR  (NON AFRICAN AMERICAN): 42 ML/MIN/1.73 M^2
ESTIMATED AVG GLUCOSE: 114 MG/DL (ref 68–131)
GLUCOSE SERPL-MCNC: 186 MG/DL (ref 70–110)
GLUCOSE SERPL-MCNC: 249 MG/DL (ref 70–110)
GLUCOSE SERPL-MCNC: 252 MG/DL (ref 70–110)
HBA1C MFR BLD: 5.6 % (ref 4–5.6)
HCO3 UR-SCNC: 21.7 MMOL/L (ref 24–28)
HCT VFR BLD AUTO: 41.1 % (ref 37–48.5)
HCT VFR BLD CALC: 42 %PCV (ref 36–54)
HGB BLD-MCNC: 13.3 G/DL (ref 12–16)
IMM GRANULOCYTES # BLD AUTO: 0.16 K/UL (ref 0–0.04)
IMM GRANULOCYTES NFR BLD AUTO: 1.2 % (ref 0–0.5)
LACTATE SERPL-SCNC: 2.5 MMOL/L (ref 0.5–2.2)
LYMPHOCYTES # BLD AUTO: 2.2 K/UL (ref 1–4.8)
LYMPHOCYTES NFR BLD: 15.8 % (ref 18–48)
MAGNESIUM SERPL-MCNC: 1.7 MG/DL (ref 1.6–2.6)
MCH RBC QN AUTO: 27.9 PG (ref 27–31)
MCHC RBC AUTO-ENTMCNC: 32.4 G/DL (ref 32–36)
MCV RBC AUTO: 86 FL (ref 82–98)
MONOCYTES # BLD AUTO: 0.3 K/UL (ref 0.3–1)
MONOCYTES NFR BLD: 2.1 % (ref 4–15)
NEUTROPHILS # BLD AUTO: 10.9 K/UL (ref 1.8–7.7)
NEUTROPHILS NFR BLD: 78.4 % (ref 38–73)
NRBC BLD-RTO: 0 /100 WBC
PCO2 BLDA: 34.9 MMHG (ref 35–45)
PH SMN: 7.4 [PH] (ref 7.35–7.45)
PHOSPHATE SERPL-MCNC: 2.5 MG/DL (ref 2.7–4.5)
PLATELET # BLD AUTO: 230 K/UL (ref 150–450)
PMV BLD AUTO: 10.5 FL (ref 9.2–12.9)
PO2 BLDA: 59 MMHG (ref 40–60)
POC BE: -3 MMOL/L
POC IONIZED CALCIUM: 1.06 MMOL/L (ref 1.06–1.42)
POC SATURATED O2: 91 % (ref 95–100)
POC TCO2 (MEASURED): 22 MMOL/L (ref 23–29)
POC TCO2: 23 MMOL/L (ref 24–29)
POCT GLUCOSE: 166 MG/DL (ref 70–110)
POCT GLUCOSE: 250 MG/DL (ref 70–110)
POCT GLUCOSE: 252 MG/DL (ref 70–110)
POCT GLUCOSE: 259 MG/DL (ref 70–110)
POTASSIUM BLD-SCNC: 3.4 MMOL/L (ref 3.5–5.1)
POTASSIUM SERPL-SCNC: 4 MMOL/L (ref 3.5–5.1)
POTASSIUM SERPL-SCNC: 4.1 MMOL/L (ref 3.5–5.1)
PROCALCITONIN SERPL IA-MCNC: 0.03 NG/ML
PROT SERPL-MCNC: 7.7 G/DL (ref 6–8.4)
RBC # BLD AUTO: 4.76 M/UL (ref 4–5.4)
SAMPLE: ABNORMAL
SAMPLE: ABNORMAL
SITE: ABNORMAL
SODIUM BLD-SCNC: 138 MMOL/L (ref 136–145)
SODIUM SERPL-SCNC: 135 MMOL/L (ref 136–145)
SODIUM SERPL-SCNC: 139 MMOL/L (ref 136–145)
TROPONIN I SERPL DL<=0.01 NG/ML-MCNC: 0.01 NG/ML (ref 0–0.03)
WBC # BLD AUTO: 13.88 K/UL (ref 3.9–12.7)

## 2022-03-05 PROCEDURE — 83735 ASSAY OF MAGNESIUM: CPT

## 2022-03-05 PROCEDURE — 99220 PR INITIAL OBSERVATION CARE,LEVL III: ICD-10-PCS | Mod: ,,, | Performed by: HOSPITALIST

## 2022-03-05 PROCEDURE — 83036 HEMOGLOBIN GLYCOSYLATED A1C: CPT

## 2022-03-05 PROCEDURE — 25000242 PHARM REV CODE 250 ALT 637 W/ HCPCS: Performed by: INTERNAL MEDICINE

## 2022-03-05 PROCEDURE — 94640 AIRWAY INHALATION TREATMENT: CPT

## 2022-03-05 PROCEDURE — 63600175 PHARM REV CODE 636 W HCPCS: Performed by: EMERGENCY MEDICINE

## 2022-03-05 PROCEDURE — 25000242 PHARM REV CODE 250 ALT 637 W/ HCPCS

## 2022-03-05 PROCEDURE — 94761 N-INVAS EAR/PLS OXIMETRY MLT: CPT

## 2022-03-05 PROCEDURE — 96365 THER/PROPH/DIAG IV INF INIT: CPT

## 2022-03-05 PROCEDURE — 25000003 PHARM REV CODE 250: Performed by: STUDENT IN AN ORGANIZED HEALTH CARE EDUCATION/TRAINING PROGRAM

## 2022-03-05 PROCEDURE — 96372 THER/PROPH/DIAG INJ SC/IM: CPT | Performed by: STUDENT IN AN ORGANIZED HEALTH CARE EDUCATION/TRAINING PROGRAM

## 2022-03-05 PROCEDURE — 27100107 HC POCKET PEAK FLOW METER

## 2022-03-05 PROCEDURE — 63700000 PHARM REV CODE 250 ALT 637 W/O HCPCS: Performed by: EMERGENCY MEDICINE

## 2022-03-05 PROCEDURE — G0378 HOSPITAL OBSERVATION PER HR: HCPCS

## 2022-03-05 PROCEDURE — 63600175 PHARM REV CODE 636 W HCPCS

## 2022-03-05 PROCEDURE — 99220 PR INITIAL OBSERVATION CARE,LEVL III: CPT | Mod: ,,, | Performed by: HOSPITALIST

## 2022-03-05 PROCEDURE — 96372 THER/PROPH/DIAG INJ SC/IM: CPT

## 2022-03-05 PROCEDURE — 99900035 HC TECH TIME PER 15 MIN (STAT)

## 2022-03-05 PROCEDURE — 87205 SMEAR GRAM STAIN: CPT

## 2022-03-05 PROCEDURE — 82803 BLOOD GASES ANY COMBINATION: CPT

## 2022-03-05 PROCEDURE — 25000003 PHARM REV CODE 250

## 2022-03-05 PROCEDURE — 87040 BLOOD CULTURE FOR BACTERIA: CPT | Mod: 59 | Performed by: EMERGENCY MEDICINE

## 2022-03-05 PROCEDURE — 96361 HYDRATE IV INFUSION ADD-ON: CPT

## 2022-03-05 PROCEDURE — 87077 CULTURE AEROBIC IDENTIFY: CPT

## 2022-03-05 PROCEDURE — 82962 GLUCOSE BLOOD TEST: CPT

## 2022-03-05 PROCEDURE — 87070 CULTURE OTHR SPECIMN AEROBIC: CPT | Mod: 59

## 2022-03-05 PROCEDURE — 80048 BASIC METABOLIC PNL TOTAL CA: CPT

## 2022-03-05 PROCEDURE — 84100 ASSAY OF PHOSPHORUS: CPT

## 2022-03-05 PROCEDURE — 63600175 PHARM REV CODE 636 W HCPCS: Performed by: STUDENT IN AN ORGANIZED HEALTH CARE EDUCATION/TRAINING PROGRAM

## 2022-03-05 PROCEDURE — 84145 PROCALCITONIN (PCT): CPT | Performed by: INTERNAL MEDICINE

## 2022-03-05 PROCEDURE — 87186 SC STD MICRODIL/AGAR DIL: CPT

## 2022-03-05 PROCEDURE — 83880 ASSAY OF NATRIURETIC PEPTIDE: CPT | Performed by: EMERGENCY MEDICINE

## 2022-03-05 PROCEDURE — 96372 THER/PROPH/DIAG INJ SC/IM: CPT | Mod: 59 | Performed by: STUDENT IN AN ORGANIZED HEALTH CARE EDUCATION/TRAINING PROGRAM

## 2022-03-05 RX ORDER — PROMETHAZINE HYDROCHLORIDE 25 MG/1
25 TABLET ORAL EVERY 6 HOURS PRN
Status: DISCONTINUED | OUTPATIENT
Start: 2022-03-05 | End: 2022-03-06 | Stop reason: HOSPADM

## 2022-03-05 RX ORDER — IPRATROPIUM BROMIDE AND ALBUTEROL SULFATE 2.5; .5 MG/3ML; MG/3ML
3 SOLUTION RESPIRATORY (INHALATION) EVERY 5 MIN PRN
Status: DISCONTINUED | OUTPATIENT
Start: 2022-03-05 | End: 2022-03-06 | Stop reason: HOSPADM

## 2022-03-05 RX ORDER — ENOXAPARIN SODIUM 100 MG/ML
40 INJECTION SUBCUTANEOUS EVERY 12 HOURS
Status: DISCONTINUED | OUTPATIENT
Start: 2022-03-05 | End: 2022-03-06 | Stop reason: HOSPADM

## 2022-03-05 RX ORDER — PREDNISONE 20 MG/1
40 TABLET ORAL DAILY
Qty: 10 TABLET | Refills: 0 | Status: SHIPPED | OUTPATIENT
Start: 2022-03-06 | End: 2022-03-06 | Stop reason: SDUPTHER

## 2022-03-05 RX ORDER — INSULIN ASPART 100 [IU]/ML
0-5 INJECTION, SOLUTION INTRAVENOUS; SUBCUTANEOUS
Status: DISCONTINUED | OUTPATIENT
Start: 2022-03-05 | End: 2022-03-06 | Stop reason: HOSPADM

## 2022-03-05 RX ORDER — AMOXICILLIN AND CLAVULANATE POTASSIUM 875; 125 MG/1; MG/1
1 TABLET, FILM COATED ORAL EVERY 12 HOURS
Status: CANCELLED | OUTPATIENT
Start: 2022-03-05

## 2022-03-05 RX ORDER — GLUCAGON 1 MG
1 KIT INJECTION
Status: DISCONTINUED | OUTPATIENT
Start: 2022-03-05 | End: 2022-03-06 | Stop reason: HOSPADM

## 2022-03-05 RX ORDER — IBUPROFEN 200 MG
16 TABLET ORAL
Status: DISCONTINUED | OUTPATIENT
Start: 2022-03-05 | End: 2022-03-06 | Stop reason: HOSPADM

## 2022-03-05 RX ORDER — KETOROLAC TROMETHAMINE 30 MG/ML
15 INJECTION, SOLUTION INTRAMUSCULAR; INTRAVENOUS EVERY 6 HOURS PRN
Status: DISCONTINUED | OUTPATIENT
Start: 2022-03-05 | End: 2022-03-06 | Stop reason: HOSPADM

## 2022-03-05 RX ORDER — LOPERAMIDE HYDROCHLORIDE 2 MG/1
2 CAPSULE ORAL
Status: DISCONTINUED | OUTPATIENT
Start: 2022-03-05 | End: 2022-03-06 | Stop reason: HOSPADM

## 2022-03-05 RX ORDER — SODIUM CHLORIDE 0.9 % (FLUSH) 0.9 %
10 SYRINGE (ML) INJECTION
Status: DISCONTINUED | OUTPATIENT
Start: 2022-03-05 | End: 2022-03-06 | Stop reason: HOSPADM

## 2022-03-05 RX ORDER — BENZONATATE 100 MG/1
100 CAPSULE ORAL 3 TIMES DAILY PRN
Qty: 30 CAPSULE | Refills: 0 | Status: SHIPPED | OUTPATIENT
Start: 2022-03-05 | End: 2022-03-15

## 2022-03-05 RX ORDER — BENZONATATE 100 MG/1
100 CAPSULE ORAL 3 TIMES DAILY PRN
Status: DISCONTINUED | OUTPATIENT
Start: 2022-03-05 | End: 2022-03-06 | Stop reason: HOSPADM

## 2022-03-05 RX ORDER — IBUPROFEN 200 MG
24 TABLET ORAL
Status: DISCONTINUED | OUTPATIENT
Start: 2022-03-05 | End: 2022-03-06 | Stop reason: HOSPADM

## 2022-03-05 RX ORDER — PREDNISONE 20 MG/1
40 TABLET ORAL DAILY
Status: DISCONTINUED | OUTPATIENT
Start: 2022-03-05 | End: 2022-03-06 | Stop reason: HOSPADM

## 2022-03-05 RX ORDER — INSULIN ASPART 100 [IU]/ML
1-10 INJECTION, SOLUTION INTRAVENOUS; SUBCUTANEOUS
Status: DISCONTINUED | OUTPATIENT
Start: 2022-03-05 | End: 2022-03-05

## 2022-03-05 RX ORDER — IPRATROPIUM BROMIDE AND ALBUTEROL SULFATE 2.5; .5 MG/3ML; MG/3ML
3 SOLUTION RESPIRATORY (INHALATION)
Status: DISCONTINUED | OUTPATIENT
Start: 2022-03-05 | End: 2022-03-05

## 2022-03-05 RX ORDER — SERTRALINE HYDROCHLORIDE 100 MG/1
100 TABLET, FILM COATED ORAL NIGHTLY
Status: DISCONTINUED | OUTPATIENT
Start: 2022-03-06 | End: 2022-03-06 | Stop reason: HOSPADM

## 2022-03-05 RX ORDER — ACETAMINOPHEN 325 MG/1
650 TABLET ORAL EVERY 4 HOURS PRN
Status: DISCONTINUED | OUTPATIENT
Start: 2022-03-05 | End: 2022-03-06 | Stop reason: HOSPADM

## 2022-03-05 RX ORDER — ONDANSETRON 4 MG/1
8 TABLET, ORALLY DISINTEGRATING ORAL EVERY 8 HOURS PRN
Status: DISCONTINUED | OUTPATIENT
Start: 2022-03-05 | End: 2022-03-06 | Stop reason: HOSPADM

## 2022-03-05 RX ORDER — IPRATROPIUM BROMIDE AND ALBUTEROL SULFATE 2.5; .5 MG/3ML; MG/3ML
3 SOLUTION RESPIRATORY (INHALATION)
Status: DISCONTINUED | OUTPATIENT
Start: 2022-03-05 | End: 2022-03-06 | Stop reason: HOSPADM

## 2022-03-05 RX ORDER — SERTRALINE HYDROCHLORIDE 50 MG/1
100 TABLET, FILM COATED ORAL NIGHTLY
Status: DISCONTINUED | OUTPATIENT
Start: 2022-03-05 | End: 2022-03-05

## 2022-03-05 RX ORDER — CLONAZEPAM 0.5 MG/1
0.5 TABLET ORAL 2 TIMES DAILY PRN
Status: DISCONTINUED | OUTPATIENT
Start: 2022-03-05 | End: 2022-03-06 | Stop reason: HOSPADM

## 2022-03-05 RX ADMIN — BENZONATATE 100 MG: 100 CAPSULE ORAL at 01:03

## 2022-03-05 RX ADMIN — IPRATROPIUM BROMIDE AND ALBUTEROL SULFATE 3 ML: 2.5; .5 SOLUTION RESPIRATORY (INHALATION) at 07:03

## 2022-03-05 RX ADMIN — CLONAZEPAM 0.5 MG: 0.5 TABLET ORAL at 08:03

## 2022-03-05 RX ADMIN — IPRATROPIUM BROMIDE AND ALBUTEROL SULFATE 3 ML: 2.5; .5 SOLUTION RESPIRATORY (INHALATION) at 12:03

## 2022-03-05 RX ADMIN — INSULIN ASPART 2 UNITS: 100 INJECTION, SOLUTION INTRAVENOUS; SUBCUTANEOUS at 01:03

## 2022-03-05 RX ADMIN — AZITHROMYCIN MONOHYDRATE 500 MG: 250 TABLET ORAL at 12:03

## 2022-03-05 RX ADMIN — PREDNISONE 40 MG: 20 TABLET ORAL at 10:03

## 2022-03-05 RX ADMIN — SODIUM CHLORIDE, SODIUM LACTATE, POTASSIUM CHLORIDE, AND CALCIUM CHLORIDE 1000 ML: .6; .31; .03; .02 INJECTION, SOLUTION INTRAVENOUS at 01:03

## 2022-03-05 RX ADMIN — IPRATROPIUM BROMIDE AND ALBUTEROL SULFATE 3 ML: .5; 2.5 SOLUTION RESPIRATORY (INHALATION) at 08:03

## 2022-03-05 RX ADMIN — CEFTRIAXONE 1 G: 1 INJECTION, SOLUTION INTRAVENOUS at 12:03

## 2022-03-05 RX ADMIN — INSULIN ASPART 1 UNITS: 100 INJECTION, SOLUTION INTRAVENOUS; SUBCUTANEOUS at 08:03

## 2022-03-05 RX ADMIN — ENOXAPARIN SODIUM 40 MG: 100 INJECTION SUBCUTANEOUS at 08:03

## 2022-03-05 RX ADMIN — IPRATROPIUM BROMIDE AND ALBUTEROL SULFATE 3 ML: 2.5; .5 SOLUTION RESPIRATORY (INHALATION) at 04:03

## 2022-03-05 NOTE — PROVIDER PROGRESS NOTES - EMERGENCY DEPT.
Encounter Date: 3/4/2022    ED Physician Progress Notes         EKG - STEMI Decision  Initial Reading: No STEMI present.

## 2022-03-05 NOTE — ASSESSMENT & PLAN NOTE
Suspect multiple simultaneous processes:  She has a history of both asthma and bronchitis, presents with productive cough, mild leukocytosis with left shift and nl eosinophils, and elevated lactate.  Also with wheezing on exam and a good response to breathing treatment.  Will continue to treat for both suspected asthma exacerbation and suspected acute bronchitis.

## 2022-03-05 NOTE — ASSESSMENT & PLAN NOTE
She has a history of presentations to urgent cares for bronchitis.  Suspect her current productive cough (of greenish/yellowish sputum) in setting of elevated white count and lactate is another episode of acute bronchitis, especially given her long history of smoking.  She was given 1 g Rocephin IV in the ED.    PLAN:  - continue Rocephin 1 g IV q12h  - sputum culture pending

## 2022-03-05 NOTE — ASSESSMENT & PLAN NOTE
Continue home sertraline 100 mg PO nightly.  She also describes a long history of taking nightly Klonopin 0.5 mg PO before bed; will continue this so as not to put her in withdrawal while she is trying to convalesce from this shortness of breath.

## 2022-03-05 NOTE — SUBJECTIVE & OBJECTIVE
Past Medical History:   Diagnosis Date    Hypertension     Ruptured disc, thoracic        Past Surgical History:   Procedure Laterality Date    ACHILLES TENDON SURGERY Left      SECTION      HEMORRHOID SURGERY      REFRACTIVE SURGERY      TONSILLECTOMY, ADENOIDECTOMY         Review of patient's allergies indicates:   Allergen Reactions    Iodine and iodide containing products Rash       No current facility-administered medications on file prior to encounter.     Current Outpatient Medications on File Prior to Encounter   Medication Sig    benazepril-hydrochlorthiazide (LOTENSIN HCT) 10-12.5 mg Tab Take 1 tablet by mouth once daily.    buPROPion (WELLBUTRIN XL) 300 MG 24 hr tablet Take 300 mg by mouth once daily.    carisoprodol (SOMA) 350 MG tablet Take 350 mg by mouth 4 (four) times daily as needed.    cetirizine 10 mg chewable tablet Take 50 mg by mouth once daily.    clonazePAM (KLONOPIN) 1 MG tablet Take 1 mg by mouth 2 (two) times daily as needed for Anxiety.    collagenase ointment Apply topically once daily.    dicyclomine (BENTYL) 20 mg tablet Take 1 tablet (20 mg total) by mouth 2 (two) times daily as needed.    gabapentin (NEURONTIN) 300 MG capsule Take 1 capsule (300 mg total) by mouth 3 (three) times daily.    lidocaine HCL 2% (XYLOCAINE) 2 % jelly Apply topically as needed. Apply topically once nightly to affected part left heel.    meloxicam (MOBIC) 15 MG tablet Take 15 mg by mouth once daily.    naproxen (NAPROSYN) 500 MG tablet Take 1 tablet (500 mg total) by mouth 2 (two) times daily with meals. Take with food    pantoprazole (PROTONIX) 40 MG tablet     PROAIR HFA 90 mcg/actuation inhaler     semaglutide (OZEMPIC SUBQ) Inject into the skin.    sertraline (ZOLOFT) 50 MG tablet Take 100 mg by mouth once daily.      Family History       Problem Relation (Age of Onset)    COPD Father    Hypertension Mother, Father          Tobacco Use    Smoking status: Former Smoker    Smokeless tobacco: Never  Used    Tobacco comment: quit in 2014   Substance and Sexual Activity    Alcohol use: Not Currently     Comment: occassionally    Drug use: No    Sexual activity: Not on file     Review of Systems   Constitutional:  Positive for activity change, diaphoresis and fatigue. Negative for chills and fever.   HENT:  Positive for postnasal drip, rhinorrhea, sinus pressure and sinus pain. Negative for congestion and sore throat.    Eyes:  Negative for pain.   Respiratory:  Positive for cough, shortness of breath and wheezing. Negative for chest tightness.    Cardiovascular:  Negative for chest pain.   Gastrointestinal:  Negative for abdominal pain, diarrhea, nausea and vomiting.   Genitourinary:  Negative for dysuria.   Musculoskeletal:  Negative for back pain.   Skin:  Negative for rash.   Neurological:  Positive for dizziness, light-headedness and headaches. Negative for syncope and weakness.   Psychiatric/Behavioral:  Negative for sleep disturbance.    Objective:     Vital Signs (Most Recent):  Temp: 98.1 °F (36.7 °C) (03/04/22 2245)  Pulse: (!) 122 (03/04/22 2349)  Resp: (!) 24 (03/04/22 2349)  BP: (!) 148/73 (03/04/22 2335)  SpO2: 100 % (03/04/22 2349)   Vital Signs (24h Range):  Temp:  [98.1 °F (36.7 °C)] 98.1 °F (36.7 °C)  Pulse:  [] 122  Resp:  [22-37] 24  SpO2:  [95 %-100 %] 100 %  BP: (142-148)/(73-89) 148/73     Weight: (!) 138.8 kg (306 lb)  Body mass index is 52.52 kg/m².    Physical Exam  Constitutional:       General: She is in acute distress.      Appearance: She is obese. She is not ill-appearing, toxic-appearing or diaphoretic.   HENT:      Head: Normocephalic and atraumatic.      Right Ear: External ear normal.      Left Ear: External ear normal.      Nose: Nose normal.      Mouth/Throat:      Mouth: Mucous membranes are dry.      Pharynx: Oropharynx is clear.   Eyes:      Extraocular Movements: Extraocular movements intact.      Pupils: Pupils are equal, round, and reactive to light.    Cardiovascular:      Rate and Rhythm: Regular rhythm. Tachycardia present.      Pulses: Normal pulses.      Heart sounds: Normal heart sounds.   Pulmonary:      Effort: Respiratory distress present.      Breath sounds: Wheezing and rhonchi present. No rales.   Abdominal:      General: Bowel sounds are normal.      Palpations: Abdomen is soft.      Tenderness: There is no abdominal tenderness.   Musculoskeletal:         General: No tenderness. Normal range of motion.      Cervical back: Normal range of motion and neck supple.   Skin:     General: Skin is warm and dry.      Capillary Refill: Capillary refill takes less than 2 seconds.   Neurological:      General: No focal deficit present.      Mental Status: She is alert and oriented to person, place, and time.   Psychiatric:         Mood and Affect: Mood normal.         Behavior: Behavior normal.         Thought Content: Thought content normal.         Judgment: Judgment normal.         CRANIAL NERVES     CN III, IV, VI   Pupils are equal, round, and reactive to light.     Significant Labs: All pertinent labs within the past 24 hours have been reviewed.  ABGs:   Recent Labs   Lab 03/05/22  0035   PH 7.402   PCO2 34.9*   HCO3 21.7*   POCSATURATED 91*   BE -3   PO2 59     CBC:   Recent Labs   Lab 03/04/22 2359 03/05/22 0035   WBC 13.88*  --    HGB 13.3  --    HCT 41.1 42     --      CMP:   Recent Labs   Lab 03/04/22 2359      K 4.1      CO2 21*   *   BUN 27*   CREATININE 1.8*   CALCIUM 9.9   PROT 7.7   ALBUMIN 4.0   BILITOT 0.5   ALKPHOS 115   AST 29   ALT 28   ANIONGAP 17*   EGFRNONAA 31.0*     Cardiac Markers:   Recent Labs   Lab 03/05/22  0011   BNP 21     Lactic Acid:   Recent Labs   Lab 03/04/22 2359   LACTATE 2.5*     Respiratory Culture: No results for input(s): GSRESP, RESPIRATORYC in the last 48 hours.    Significant Imaging: I have reviewed all pertinent imaging results/findings within the past 24 hours.  CXR: I have reviewed  all pertinent results/findings within the past 24 hours and my personal findings are:  no acute findings

## 2022-03-05 NOTE — ASSESSMENT & PLAN NOTE
on presentation.  Does not use insulin at home.    PLAN:  - Moderate dose insulin sliding scale until we have more data about how she responds to insulin; transition to basal-bolus at that time.  - Diabetic diet  - Hold home Ozempic while hospitalized.

## 2022-03-05 NOTE — HPI
Brittany Patricio is a 56yoF with PMHx asthma, HTN, DM2, depression, and arthritis who presented to [facility] on 3/4/22 with worsening dyspnea and productive green/yellow sputum.  Started with productive cough Saturday, no OTC, albuterol inhaler, or nebulizer worked.  Associated with fever, diaphoresis, chest tightness, intermittent headache, fatigue.  PCP called in script yesterday for Bactrim and dexamethasone (took 3 total prior to coming to [facility]).  Can't sleep laying flat so has been sleeping in recliner.     On arrival to [facility], she was in respiratory distress, diaphoretic, tachypnic, tachycardic, and hypertensive.  O2 sats 96% on RA.  CXR showed no acute process.  She was started on inhaler treatment with improvement however continuing to have tachycardia, using accessory muscles for breathing.  Patient already on home dexamethasone and not given steroids in ED for that reason.  Started on CAP coverage and also found to have an ALANIS; 1L LR bolus given.      She was a smoker for 32 years, quit in 2014 by transitioning to vaping for a few months, and hasn't smoked since.  However, it was around this time that she started having symptoms of asthma.  She says she was never diagnosed with COPD and has not had any episode like this before.  Chart review reveals a visit to [facility] urgent care in 2019 for bronchitis, and multiple [facility] urgent care visits for bronchitis.    She was given another series of breathing treatments and admitted to Hospital Medicine for further evaluation and management.

## 2022-03-05 NOTE — ASSESSMENT & PLAN NOTE
Body mass index is 52.52 kg/m². Morbid obesity complicates all aspects of disease management from diagnostic modalities to treatment. Weight loss encouraged and health benefits explained to patient.

## 2022-03-05 NOTE — PLAN OF CARE
Problem: Adult Inpatient Plan of Care  Goal: Plan of Care Review  Outcome: Ongoing, Progressing     Problem: Adult Inpatient Plan of Care  Goal: Patient-Specific Goal (Individualized)  Outcome: Ongoing, Progressing     Problem: Adult Inpatient Plan of Care  Goal: Absence of Hospital-Acquired Illness or Injury  Outcome: Ongoing, Progressing

## 2022-03-05 NOTE — ASSESSMENT & PLAN NOTE
Patient with acute kidney injury likely d/t unknown at this time. Euvolemic on exam.  Labs reviewed- Renal function/electrolytes with Estimated Creatinine Clearance: 48.6 mL/min (A) (based on SCr of 1.8 mg/dL (H)).  And BUN 27 according to latest data.  Most recent data prior to presentation [place] [facility] at that time, creatinine was 0.9.      1L bolus LR given.  Monitor urine output and serial BMP and adjust therapy as needed. Avoid nephrotoxins and renally dose meds for GFR listed above.

## 2022-03-05 NOTE — ED NOTES
I-STAT Chem-8+ Results:   Value Reference Range   Sodium 138 136-145 mmol/L   Potassium  3.4 3.5-5.1 mmol/L   Chloride 103  mmol/L   Ionized Calcium 1.06 1.06-1.42 mmol/L   CO2 (measured) 22 23-29 mmol/L   Glucose 249  mg/dL   BUN 30 6-30 mg/dL   Creatinine 1.3 0.5-1.4 mg/dL   Hematocrit 42 36-54%

## 2022-03-05 NOTE — HOSPITAL COURSE
Pt admitted for management of acute bronchitis. Pt started on Ctx, duo-nebs, PO steroids. Pt responding well to treatment, wheezing resolved 3/5. Procal wnl, so pt taken off abx. Pt observed overnight on 3/5 with anticipated discharge on 3/6. Doing well 3/6 AM, no wheezing or tachypnea. Discharged to complete 5 day prednisone course with PCP follow up and PFTs given former smoking history.

## 2022-03-05 NOTE — ED PROVIDER NOTES
Encounter Date: 3/4/2022       History     Chief Complaint   Patient presents with    Shortness of Breath     Pt diaphoretic at triage c/o cough and SOB x1 week, multiple negative at home covid tests, cough productive of green sputum.     Ms. Patricio is a 56yoF with PMHx of HTN, DM2, asthma who is presenting to ED for progressively worsening dyspnea for 1 week associated with productive green/yellow sputum.  Patient has attempted inhalers and was recently prescribed dexamethasone and bactrim by PCP.  Reports palpitations and diaphoresis with fevers at home.  Denies chest pain, abdominal pain, headaches, n/v.  Patient states not taking home BP medications for a few days.        Review of patient's allergies indicates:   Allergen Reactions    Iodine and iodide containing products Rash     Past Medical History:   Diagnosis Date    Hypertension     Ruptured disc, thoracic      Past Surgical History:   Procedure Laterality Date    ACHILLES TENDON SURGERY Left      SECTION      HEMORRHOID SURGERY      REFRACTIVE SURGERY      TONSILLECTOMY, ADENOIDECTOMY       Family History   Problem Relation Age of Onset    Hypertension Mother     Hypertension Father     COPD Father      Social History     Tobacco Use    Smoking status: Former Smoker    Smokeless tobacco: Never Used    Tobacco comment: quit in    Substance Use Topics    Alcohol use: Not Currently     Comment: occassionally    Drug use: No     Review of Systems   Constitutional: Positive for diaphoresis and fever.   HENT: Negative for sore throat, trouble swallowing and voice change.    Eyes: Negative for pain and visual disturbance.   Respiratory: Positive for cough and shortness of breath.    Cardiovascular: Positive for palpitations. Negative for chest pain and leg swelling.   Gastrointestinal: Negative for abdominal pain, constipation, diarrhea, nausea and vomiting.   Genitourinary: Negative for difficulty urinating, dysuria and hematuria.    Musculoskeletal: Negative for myalgias and neck pain.   Skin: Negative for rash and wound.   Neurological: Negative for dizziness, numbness and headaches.   Psychiatric/Behavioral: Negative for agitation and confusion.       Physical Exam     Initial Vitals [03/04/22 2245]   BP Pulse Resp Temp SpO2   (!) 142/89 (!) 112 (!) 24 98.1 °F (36.7 °C) 97 %      MAP       --         Physical Exam    Nursing note and vitals reviewed.  Constitutional: She appears well-developed and well-nourished. She is diaphoretic. She appears distressed.   HENT:   Head: Normocephalic and atraumatic.   Mouth/Throat: Oropharynx is clear and moist. No oropharyngeal exudate.   Eyes: Conjunctivae and EOM are normal. No scleral icterus.   Neck: Neck supple.   Normal range of motion.  Cardiovascular: Regular rhythm and intact distal pulses. Tachycardia present.    Pulmonary/Chest: She is in respiratory distress. She has wheezes. She has no rales. She exhibits no tenderness.   Abdominal: Abdomen is soft. Bowel sounds are normal. She exhibits no distension. There is no abdominal tenderness. There is no rebound and no guarding.   Musculoskeletal:      Cervical back: Normal range of motion and neck supple.     Lymphadenopathy:     She has no cervical adenopathy.   Neurological: She is alert and oriented to person, place, and time. No sensory deficit.   Skin: Skin is warm. No erythema.   Psychiatric: She has a normal mood and affect. Her behavior is normal. Thought content normal.         ED Course   Procedures  Labs Reviewed   CBC W/ AUTO DIFFERENTIAL - Abnormal; Notable for the following components:       Result Value    WBC 13.88 (*)     Immature Granulocytes 1.2 (*)     Gran # (ANC) 10.9 (*)     Immature Grans (Abs) 0.16 (*)     Gran % 78.4 (*)     Lymph % 15.8 (*)     Mono % 2.1 (*)     All other components within normal limits   COMPREHENSIVE METABOLIC PANEL - Abnormal; Notable for the following components:    CO2 21 (*)     Glucose 186 (*)      "BUN 27 (*)     Creatinine 1.8 (*)     Anion Gap 17 (*)     eGFR if  35.8 (*)     eGFR if non  31.0 (*)     All other components within normal limits   LACTIC ACID, PLASMA - Abnormal; Notable for the following components:    Lactate (Lactic Acid) 2.5 (*)     All other components within normal limits   ISTAT PROCEDURE - Abnormal; Notable for the following components:    POC PCO2 34.9 (*)     POC HCO3 21.7 (*)     POC SATURATED O2 91 (*)     POC TCO2 23 (*)     All other components within normal limits   ISTAT PROCEDURE - Abnormal; Notable for the following components:    POC Glucose 249 (*)     POC Potassium 3.4 (*)     POC TCO2 (MEASURED) 22 (*)     All other components within normal limits   CULTURE, BLOOD   CULTURE, BLOOD   CULTURE, RESPIRATORY   B-TYPE NATRIURETIC PEPTIDE   TROPONIN I   HIV 1 / 2 ANTIBODY   HEPATITIS C ANTIBODY   BASIC METABOLIC PANEL   MAGNESIUM   PHOSPHORUS   SARS-COV-2 RDRP GENE   ISTAT CHEM8     EKG Readings: (Independently Interpreted)   Rhythm: Sinus Tachycardia. Heart Rate: 107. Ectopy: PACs.       Imaging Results          X-Ray Chest AP Portable (SOB) (Final result)  Result time 03/05/22 00:30:16    Final result by Siddhartha Davis MD (03/05/22 00:30:16)                 Impression:      No acute process identified on this limited single view.      Electronically signed by: Siddhartha Davis MD  Date:    03/05/2022  Time:    00:30             Narrative:    EXAMINATION:  XR CHEST AP PORTABLE    CLINICAL HISTORY:  Provided history is "SOB;  ".    TECHNIQUE:  One view of the chest.    COMPARISON:  12/03/2017.    FINDINGS:  Exam quality is limited by portable technique and soft tissue attenuation of the x-ray beam.  Cardiac wires overlie the chest.  Cardiomediastinal silhouette is magnified by portable technique but not felt to be enlarged.  No large focal consolidation.  No sizable pleural effusion.  No pneumothorax.                                 Medications "   cefTRIAXone (ROCEPHIN) 1 g/50 mL D5W IVPB (1 g Intravenous New Bag 3/5/22 0051)   lactated ringers bolus 1,000 mL (has no administration in time range)   azithromycin tablet 500 mg (500 mg Oral Given 3/5/22 0024)   albuterol-ipratropium 2.5 mg-0.5 mg/3 mL nebulizer solution 3 mL (3 mLs Nebulization Given 3/4/22 5512)     Medical Decision Making:   Initial Assessment:   Ms. Patricio is a 56yoF with PMHx of HTN, DM2, asthma who is presenting to ED for progressively worsening dyspnea for 1 week associated with productive green/yellow sputum.  Differential Diagnosis:   PNA  Decompensated heart failure  Asthma exacerbation  COVID  LOURDES/air trapping  Clinical Tests:   Lab Tests: Ordered and Reviewed  Radiological Study: Ordered and Reviewed  Medical Tests: Ordered and Reviewed  ED Management:  Patient in respiratory distress on arrival, diaphoretic, tachypnic, tachycardic, and hypertensive.  O2 sats 96% on RA.  Started on inhaler treatment with mild improvement.  Labs, troponin, BNP, lactic, CXR, EKG ordered.  Cx drawn.  COVID negative.  Labs remarkable for ALANIS with leukocytosis and mildly elevated lactate.  Continuing to be tachycardic and breathing with accessory muscle use.  Started on CAP coverage, not given steroids as patient recently started on home dexamethasone with dose today.  Less likely HF with BNP and troponin wnl.  CXR with no acute process seen however concerning for possible PNA in lower lobe.  Started treatment for PNA as consideration of PNA vs asthma exacerbation.  Will place patient in observation with hospital medicine for concern of worsening dyspnea and possible muscle fatigue possibly needing BiPAP.            Attending Attestation:             Attending ED Notes:   Attending Note:  I have seen the patient, have repeated the key portions of the history and physical, reviewed and agree with the medical documentation, and supervised and managed the medical care of the patient. Additionally, I was  present for the critical portion of any procedure(s) performed.    56-year-old female history of asthma presenting today with acutely worsen shortness of breath over the past couple days, despite treatment with albuterol nebulizer and dexamethasone.  Tachycardic, tachypneic, diaphoretic on arrival.  Poor air entry bilaterally, diffuse wheezing, retractions.  Patient treated with DuoNebs with improvement.  Oxygen saturation 98% on room air.  She did feel slightly shaky after getting the nebulizer which she states is a common adverse reaction.    Cardiac workup negative.  Chest x-ray with possible right lower lobe infiltrate.  Patient treated for CAP, acute asthma exacerbation, ALANIS.    Critical Care time:35 minutes inclusive of direct patient care, review of previous records, interpretation of labs, imaging and ekg, as well as discussion of my impression and plan of care with the patient, family and other clinicians/consultants. This time is exclusive of any separate billable procedures and of treating other patients.      AGATHA Amato MD      ED Course as of 03/05/22 0240   Sat Mar 05, 2022   0050 BNP: 21 [GM]   0050 Troponin I: 0.008 [GM]   0050 Lactate, Stoney(!): 2.5 [GM]   0051 WBC(!): 13.88 [GM]   0051 BUN(!): 27 [GM]   0051 Creatinine(!): 1.8 [GM]      ED Course User Index  [GM] Wendy Amato MD             Clinical Impression:   Final diagnoses:  [R06.02] SOB (shortness of breath)  [N17.9] ALANIS (acute kidney injury) (Primary)  [J45.51] Severe persistent asthma with acute exacerbation  [J18.9] Pneumonia of right lower lobe due to infectious organism          ED Disposition Condition    Observation               Jose Carias MD  Resident  03/05/22 0114       Wendy Amato MD  03/05/22 0241       Wendy Amato MD  03/05/22 0242

## 2022-03-05 NOTE — H&P
[facility] - Emergency Dept  [facility] Medicine  History & Physical    Patient Name: Brittany Patricio  MRN: [id number]  Patient Class: OP- Observation  Admission Date: 3/4/2022  Attending Physician: Bernie Martel MD   Primary Care Provider: Vicente Dao MD         Patient information was obtained from patient, past medical records and ER records.     Subjective:     Principal Problem:Shortness of breath    Chief Complaint:   Chief Complaint   Patient presents with    Shortness of Breath     Pt diaphoretic at triage c/o cough and SOB x1 week, multiple negative at home covid tests, cough productive of green sputum.        HPI: Brittany Patricio is a 56yoF with PMHx asthma, HTN, DM2, depression, and arthritis who presented to [facility] on 3/4/22 with worsening dyspnea and productive green/yellow sputum.  Started with productive cough Saturday, no OTC, albuterol inhaler, or nebulizer worked.  Associated with fever, diaphoresis, chest tightness, intermittent headache, fatigue.  PCP called in script yesterday for Bactrim and dexamethasone (took 3 total prior to coming to [facility]).  Can't sleep laying flat so has been sleeping in recliner.     On arrival to [facility]ED, she was in respiratory distress, diaphoretic, tachypnic, tachycardic, and hypertensive.  O2 sats 96% on RA.  CXR showed no acute process.  She was started on inhaler treatment with improvement however continuing to have tachycardia, using accessory muscles for breathing.  Patient already on home dexamethasone and not given steroids in ED for that reason.  Started on CAP coverage and also found to have an ALANIS; 1L LR bolus given.      She was a smoker for 32 years, quit in 2014 by transitioning to vaping for a few months, and hasn't smoked since.  However, it was around this time that she started having symptoms of asthma.  She says she was never diagnosed with COPD and has not had any episode like this before.  Chart review reveals a visit to [facility] urgent care in 2019 for  bronchitis, and multiple [facility] urgent care visits for bronchitis.    She was given another series of breathing treatments and admitted to Hospital Medicine for further evaluation and management.      Past Medical History:   Diagnosis Date    Hypertension     Ruptured disc, thoracic        Past Surgical History:   Procedure Laterality Date    ACHILLES TENDON SURGERY Left      SECTION      HEMORRHOID SURGERY      REFRACTIVE SURGERY      TONSILLECTOMY, ADENOIDECTOMY         Review of patient's allergies indicates:   Allergen Reactions    Iodine and iodide containing products Rash       No current facility-administered medications on file prior to encounter.     Current Outpatient Medications on File Prior to Encounter   Medication Sig    benazepril-hydrochlorthiazide (LOTENSIN HCT) 10-12.5 mg Tab Take 1 tablet by mouth once daily.    buPROPion (WELLBUTRIN XL) 300 MG 24 hr tablet Take 300 mg by mouth once daily.    carisoprodol (SOMA) 350 MG tablet Take 350 mg by mouth 4 (four) times daily as needed.    cetirizine 10 mg chewable tablet Take 50 mg by mouth once daily.    clonazePAM (KLONOPIN) 1 MG tablet Take 1 mg by mouth 2 (two) times daily as needed for Anxiety.    collagenase ointment Apply topically once daily.    dicyclomine (BENTYL) 20 mg tablet Take 1 tablet (20 mg total) by mouth 2 (two) times daily as needed.    gabapentin (NEURONTIN) 300 MG capsule Take 1 capsule (300 mg total) by mouth 3 (three) times daily.    lidocaine HCL 2% (XYLOCAINE) 2 % jelly Apply topically as needed. Apply topically once nightly to affected part left heel.    meloxicam (MOBIC) 15 MG tablet Take 15 mg by mouth once daily.    naproxen (NAPROSYN) 500 MG tablet Take 1 tablet (500 mg total) by mouth 2 (two) times daily with meals. Take with food    pantoprazole (PROTONIX) 40 MG tablet     PROAIR HFA 90 mcg/actuation inhaler     semaglutide (OZEMPIC SUBQ) Inject into the skin.    sertraline (ZOLOFT) 50 MG  tablet Take 100 mg by mouth once daily.      Family History       Problem Relation (Age of Onset)    COPD Father    Hypertension Mother, Father          Tobacco Use    Smoking status: Former Smoker    Smokeless tobacco: Never Used    Tobacco comment: quit in 2014   Substance and Sexual Activity    Alcohol use: Not Currently     Comment: occassionally    Drug use: No    Sexual activity: Not on file     Review of Systems   Constitutional:  Positive for activity change, diaphoresis and fatigue. Negative for chills and fever.   HENT:  Positive for postnasal drip, rhinorrhea, sinus pressure and sinus pain. Negative for congestion and sore throat.    Eyes:  Negative for pain.   Respiratory:  Positive for cough, shortness of breath and wheezing. Negative for chest tightness.    Cardiovascular:  Negative for chest pain.   Gastrointestinal:  Negative for abdominal pain, diarrhea, nausea and vomiting.   Genitourinary:  Negative for dysuria.   Musculoskeletal:  Negative for back pain.   Skin:  Negative for rash.   Neurological:  Positive for dizziness, light-headedness and headaches. Negative for syncope and weakness.   Psychiatric/Behavioral:  Negative for sleep disturbance.    Objective:     Vital Signs (Most Recent):  Temp: 98.1 °F (36.7 °C) (03/04/22 2245)  Pulse: (!) 122 (03/04/22 2349)  Resp: (!) 24 (03/04/22 2349)  BP: (!) 148/73 (03/04/22 2335)  SpO2: 100 % (03/04/22 2349)   Vital Signs (24h Range):  Temp:  [98.1 °F (36.7 °C)] 98.1 °F (36.7 °C)  Pulse:  [] 122  Resp:  [22-37] 24  SpO2:  [95 %-100 %] 100 %  BP: (142-148)/(73-89) 148/73     Weight: (!) 138.8 kg (306 lb)  Body mass index is 52.52 kg/m².    Physical Exam  Constitutional:       General: She is in acute distress.      Appearance: She is obese. She is not ill-appearing, toxic-appearing or diaphoretic.   HENT:      Head: Normocephalic and atraumatic.      Right Ear: External ear normal.      Left Ear: External ear normal.      Nose: Nose normal.       Mouth/Throat:      Mouth: Mucous membranes are dry.      Pharynx: Oropharynx is clear.   Eyes:      Extraocular Movements: Extraocular movements intact.      Pupils: Pupils are equal, round, and reactive to light.   Cardiovascular:      Rate and Rhythm: Regular rhythm. Tachycardia present.      Pulses: Normal pulses.      Heart sounds: Normal heart sounds.   Pulmonary:      Effort: Respiratory distress present.      Breath sounds: Wheezing and rhonchi present. No rales.   Abdominal:      General: Bowel sounds are normal.      Palpations: Abdomen is soft.      Tenderness: There is no abdominal tenderness.   Musculoskeletal:         General: No tenderness. Normal range of motion.      Cervical back: Normal range of motion and neck supple.   Skin:     General: Skin is warm and dry.      Capillary Refill: Capillary refill takes less than 2 seconds.   Neurological:      General: No focal deficit present.      Mental Status: She is alert and oriented to person, place, and time.   Psychiatric:         Mood and Affect: Mood normal.         Behavior: Behavior normal.         Thought Content: Thought content normal.         Judgment: Judgment normal.         CRANIAL NERVES     CN III, IV, VI   Pupils are equal, round, and reactive to light.     Significant Labs: All pertinent labs within the past 24 hours have been reviewed.  ABGs:   Recent Labs   Lab 03/05/22 0035   PH 7.402   PCO2 34.9*   HCO3 21.7*   POCSATURATED 91*   BE -3   PO2 59     CBC:   Recent Labs   Lab 03/04/22 2359 03/05/22 0035   WBC 13.88*  --    HGB 13.3  --    HCT 41.1 42     --      CMP:   Recent Labs   Lab 03/04/22 2359      K 4.1      CO2 21*   *   BUN 27*   CREATININE 1.8*   CALCIUM 9.9   PROT 7.7   ALBUMIN 4.0   BILITOT 0.5   ALKPHOS 115   AST 29   ALT 28   ANIONGAP 17*   EGFRNONAA 31.0*     Cardiac Markers:   Recent Labs   Lab 03/05/22  0011   BNP 21     Lactic Acid:   Recent Labs   Lab 03/04/22 2359   LACTATE 2.5*      Respiratory Culture: No results for input(s): GSRESP, RESPIRATORYC in the last 48 hours.    Significant Imaging: I have reviewed all pertinent imaging results/findings within the past 24 hours.  CXR: I have reviewed all pertinent results/findings within the past 24 hours and my personal findings are:  no acute findings    Assessment/Plan:     * Shortness of breath  Suspect multiple simultaneous processes:  She has a history of both asthma and bronchitis, presents with productive cough, mild leukocytosis with left shift and nl eosinophils, and elevated lactate.  Also with wheezing on exam and a good response to breathing treatment.  Will continue to treat for both suspected asthma exacerbation and suspected acute bronchitis.    Productive cough  She has a history of presentations to urgent cares for bronchitis.  Suspect her current productive cough (of greenish/yellowish sputum) in setting of elevated white count and lactate is another episode of acute bronchitis, especially given her long history of smoking.  She was given 1 g Rocephin IV in the ED.    PLAN:  - Continue Rocephin 1 g IV q12h  - Sputum culture pending  - Trend lactate      Asthma  Diffuse wheezing heard on exam.  She took 3 doses of dexamethasone prior to presentation.  Continue DuoNebs q4h PRN.      ALANIS (acute kidney injury)  Patient with acute kidney injury likely d/t unknown at this time. Euvolemic on exam.  Labs reviewed- Renal function/electrolytes with Estimated Creatinine Clearance: 48.6 mL/min (A) (based on SCr of 1.8 mg/dL (H)).  And BUN 27 according to latest data.  Most recent data prior to presentation was October 2021 at [facility] [place] [facility]; at that time, creatinine was 0.9.      1L bolus LR given.  Monitor urine output and serial BMP and adjust therapy as needed. Avoid nephrotoxins and renally dose meds for GFR listed above.       Type 2 diabetes mellitus, without long-term current use of insulin   on presentation.  Does not use insulin at  home.    PLAN:  - Moderate dose insulin sliding scale until we have more data about how she responds to insulin; transition to basal-bolus at that time.  - Diabetic diet  - Hold home Ozempic while hospitalized.      Morbid obesity with BMI of 50.0-59.9, adult  Body mass index is 52.52 kg/m². Morbid obesity complicates all aspects of disease management from diagnostic modalities to treatment. Weight loss encouraged and health benefits explained to patient.         Essential hypertension  Holding home antihypertensives due to soft blood pressure and ED.  Resume when stable.      Anxiety  Continue home sertraline 100 mg PO nightly.  She also describes a long history of taking nightly Klonopin 0.5 mg PO before bed; will continue this so as not to put her in withdrawal while she is trying to convalesce from this shortness of breath.      VTE Risk Mitigation (From admission, onward)         Ordered     enoxaparin injection 40 mg  Every 12 hours         03/05/22 0231     IP VTE HIGH RISK PATIENT  Once         03/05/22 0231     Place sequential compression device  Until discontinued         03/05/22 0231                   Markus Gama MD  Department of Hospital Medicine   [facility] - Emergency Dept

## 2022-03-05 NOTE — ASSESSMENT & PLAN NOTE
She has a history of presentations to urgent cares for bronchitis.  Suspect her current productive cough (of greenish/yellowish sputum) in setting of elevated white count and lactate is another episode of acute bronchitis, especially given her long history of smoking.  She was given 1 g Rocephin IV in the ED.    PLAN:  - continue Rocephin 1 g IV q12h  - sputum culture pending  - trend lactate

## 2022-03-05 NOTE — ASSESSMENT & PLAN NOTE
Diffuse wheezing heard on exam.  She took 3 doses of dexamethasone prior to presentation.  Continue DuoNebs q4h PRN.

## 2022-03-06 VITALS
DIASTOLIC BLOOD PRESSURE: 75 MMHG | HEIGHT: 64 IN | TEMPERATURE: 98 F | RESPIRATION RATE: 16 BRPM | OXYGEN SATURATION: 95 % | WEIGHT: 293 LBS | SYSTOLIC BLOOD PRESSURE: 130 MMHG | BODY MASS INDEX: 50.02 KG/M2 | HEART RATE: 75 BPM

## 2022-03-06 PROBLEM — J20.9 ACUTE BRONCHITIS: Status: ACTIVE | Noted: 2022-03-05

## 2022-03-06 PROBLEM — J45.901 ASTHMA EXACERBATION: Status: ACTIVE | Noted: 2022-03-05

## 2022-03-06 LAB
ANION GAP SERPL CALC-SCNC: 8 MMOL/L (ref 8–16)
BASOPHILS # BLD AUTO: 0.06 K/UL (ref 0–0.2)
BASOPHILS NFR BLD: 0.6 % (ref 0–1.9)
BUN SERPL-MCNC: 17 MG/DL (ref 6–20)
CALCIUM SERPL-MCNC: 9.3 MG/DL (ref 8.7–10.5)
CHLORIDE SERPL-SCNC: 102 MMOL/L (ref 95–110)
CO2 SERPL-SCNC: 27 MMOL/L (ref 23–29)
CREAT SERPL-MCNC: 0.8 MG/DL (ref 0.5–1.4)
DIFFERENTIAL METHOD: ABNORMAL
EOSINOPHIL # BLD AUTO: 0 K/UL (ref 0–0.5)
EOSINOPHIL NFR BLD: 0.3 % (ref 0–8)
ERYTHROCYTE [DISTWIDTH] IN BLOOD BY AUTOMATED COUNT: 14.1 % (ref 11.5–14.5)
EST. GFR  (AFRICAN AMERICAN): >60 ML/MIN/1.73 M^2
EST. GFR  (NON AFRICAN AMERICAN): >60 ML/MIN/1.73 M^2
GLUCOSE SERPL-MCNC: 111 MG/DL (ref 70–110)
HCT VFR BLD AUTO: 39.3 % (ref 37–48.5)
HGB BLD-MCNC: 12.6 G/DL (ref 12–16)
IMM GRANULOCYTES # BLD AUTO: 0.16 K/UL (ref 0–0.04)
IMM GRANULOCYTES NFR BLD AUTO: 1.5 % (ref 0–0.5)
LYMPHOCYTES # BLD AUTO: 3.3 K/UL (ref 1–4.8)
LYMPHOCYTES NFR BLD: 31.4 % (ref 18–48)
MAGNESIUM SERPL-MCNC: 2.3 MG/DL (ref 1.6–2.6)
MCH RBC QN AUTO: 28.5 PG (ref 27–31)
MCHC RBC AUTO-ENTMCNC: 32.1 G/DL (ref 32–36)
MCV RBC AUTO: 89 FL (ref 82–98)
MONOCYTES # BLD AUTO: 0.5 K/UL (ref 0.3–1)
MONOCYTES NFR BLD: 5 % (ref 4–15)
NEUTROPHILS # BLD AUTO: 6.4 K/UL (ref 1.8–7.7)
NEUTROPHILS NFR BLD: 61.2 % (ref 38–73)
NRBC BLD-RTO: 0 /100 WBC
PHOSPHATE SERPL-MCNC: 3 MG/DL (ref 2.7–4.5)
PLATELET # BLD AUTO: 227 K/UL (ref 150–450)
PMV BLD AUTO: 10.7 FL (ref 9.2–12.9)
POCT GLUCOSE: 206 MG/DL (ref 70–110)
POCT GLUCOSE: 98 MG/DL (ref 70–110)
POTASSIUM SERPL-SCNC: 4.3 MMOL/L (ref 3.5–5.1)
RBC # BLD AUTO: 4.42 M/UL (ref 4–5.4)
SODIUM SERPL-SCNC: 137 MMOL/L (ref 136–145)
WBC # BLD AUTO: 10.47 K/UL (ref 3.9–12.7)

## 2022-03-06 PROCEDURE — 96372 THER/PROPH/DIAG INJ SC/IM: CPT

## 2022-03-06 PROCEDURE — 99900035 HC TECH TIME PER 15 MIN (STAT)

## 2022-03-06 PROCEDURE — 63600175 PHARM REV CODE 636 W HCPCS: Performed by: STUDENT IN AN ORGANIZED HEALTH CARE EDUCATION/TRAINING PROGRAM

## 2022-03-06 PROCEDURE — 99217 PR OBSERVATION CARE DISCHARGE: ICD-10-PCS | Mod: ,,, | Performed by: INTERNAL MEDICINE

## 2022-03-06 PROCEDURE — 80048 BASIC METABOLIC PNL TOTAL CA: CPT | Mod: 91

## 2022-03-06 PROCEDURE — 63600175 PHARM REV CODE 636 W HCPCS

## 2022-03-06 PROCEDURE — G0378 HOSPITAL OBSERVATION PER HR: HCPCS

## 2022-03-06 PROCEDURE — 94761 N-INVAS EAR/PLS OXIMETRY MLT: CPT

## 2022-03-06 PROCEDURE — 94640 AIRWAY INHALATION TREATMENT: CPT

## 2022-03-06 PROCEDURE — 84100 ASSAY OF PHOSPHORUS: CPT

## 2022-03-06 PROCEDURE — 83735 ASSAY OF MAGNESIUM: CPT

## 2022-03-06 PROCEDURE — 27100107 HC POCKET PEAK FLOW METER

## 2022-03-06 PROCEDURE — 36415 COLL VENOUS BLD VENIPUNCTURE: CPT

## 2022-03-06 PROCEDURE — 25000003 PHARM REV CODE 250: Performed by: STUDENT IN AN ORGANIZED HEALTH CARE EDUCATION/TRAINING PROGRAM

## 2022-03-06 PROCEDURE — 94760 N-INVAS EAR/PLS OXIMETRY 1: CPT

## 2022-03-06 PROCEDURE — 99217 PR OBSERVATION CARE DISCHARGE: CPT | Mod: ,,, | Performed by: INTERNAL MEDICINE

## 2022-03-06 PROCEDURE — 85025 COMPLETE CBC W/AUTO DIFF WBC: CPT

## 2022-03-06 PROCEDURE — 25000242 PHARM REV CODE 250 ALT 637 W/ HCPCS: Performed by: INTERNAL MEDICINE

## 2022-03-06 RX ORDER — PREDNISONE 20 MG/1
40 TABLET ORAL DAILY
Qty: 6 TABLET | Refills: 0 | Status: SHIPPED | OUTPATIENT
Start: 2022-03-06 | End: 2022-03-09

## 2022-03-06 RX ADMIN — BENZONATATE 100 MG: 100 CAPSULE ORAL at 07:03

## 2022-03-06 RX ADMIN — PREDNISONE 40 MG: 20 TABLET ORAL at 08:03

## 2022-03-06 RX ADMIN — IPRATROPIUM BROMIDE AND ALBUTEROL SULFATE 3 ML: 2.5; .5 SOLUTION RESPIRATORY (INHALATION) at 01:03

## 2022-03-06 RX ADMIN — ENOXAPARIN SODIUM 40 MG: 100 INJECTION SUBCUTANEOUS at 08:03

## 2022-03-06 RX ADMIN — IPRATROPIUM BROMIDE AND ALBUTEROL SULFATE 3 ML: 2.5; .5 SOLUTION RESPIRATORY (INHALATION) at 07:03

## 2022-03-06 NOTE — NURSING
Patient is ready for discharge.VSS. Patient stable alert and oriented. IVs, telemetry removed. No complaints of pain. Discussed discharge plan. Reviewed medications and side effects, appointments, and answered questions with patient and family. Prescriptions sent to patient's home pharmacy. Patient waiting for wheelchair to wheel her down to vehicle.

## 2022-03-06 NOTE — PLAN OF CARE
Patient is AOX4 . Fall precautions in place, call light in reach , bed in lowest position . Patient remained free from fall /injuries/trauma . V/S Within normal limits. No chest pain, SOB  MARY . Slept well overnight . Plan of care reviewed with the patient . No further concerns at this time.

## 2022-03-06 NOTE — DISCHARGE SUMMARY
[facility] Abarca - Cardiology [facility] Medicine  Discharge Summary      Patient Name: Brittany Patricio  MRN: [id number]  Patient Class: OP- Observation  Admission Date: 3/4/2022  Hospital Length of Stay: 2 days  Discharge Date and Time:  03/06/2022 1:48 PM  Attending Physician: Bernie Martel MD   Discharging Provider: Mayte Gilbert MD  Primary Care Provider: Vicente Dao MD  Hospital Medicine Team: [facility] HOSP MED 4 Mayte Gilbert MD    HPI:   Brittany Patricio is a 56yoF with PMHx asthma, HTN, DM2, depression, and arthritis who presented to [facility] on 3/4/22 with worsening dyspnea and productive green/yellow sputum.  Started with productive cough Saturday, no OTC, albuterol inhaler, or nebulizer worked.  Associated with fever, diaphoresis, chest tightness, intermittent headache, fatigue.  PCP called in script yesterday for Bactrim and dexamethasone (took 3 total prior to coming to [facility]).  Can't sleep laying flat so has been sleeping in recliner.     On arrival to [facility], she was in respiratory distress, diaphoretic, tachypnic, tachycardic, and hypertensive.  O2 sats 96% on RA.  CXR showed no acute process.  She was started on inhaler treatment with improvement however continuing to have tachycardia, using accessory muscles for breathing.  Patient already on home dexamethasone and not given steroids in ED for that reason.  Started on CAP coverage and also found to have an ALANIS; 1L LR bolus given.      She was a smoker for 32 years, quit in 2014 by transitioning to vaping for a few months, and hasn't smoked since.  However, it was around this time that she started having symptoms of asthma.  She says she was never diagnosed with COPD and has not had any episode like this before.  Chart review reveals a visit to [facility] urgent care in 2019 for bronchitis, and multiple [facility] urgent care visits for bronchitis.    She was given another series of breathing treatments and admitted to Hospital Medicine for further evaluation and  "management.      * No surgery found *      Hospital Course:   Pt admitted for management of acute bronchitis. Pt started on Ctx, duo-nebs, PO steroids. Pt responding well to treatment, wheezing resolved 3/5. Procal wnl, so pt taken off abx. Pt observed overnight on 3/5 with anticipated discharge on 3/6. Doing well 3/6 AM, no wheezing or tachypnea. Discharged to complete 5 day prednisone course with PCP follow up and PFTs given former smoking history.      /75 (BP Location: Right arm, Patient Position: Lying)   Pulse 75   Temp 97.8 °F (36.6 °C) (Oral)   Resp 16   Ht 5' 4" (1.626 m)   Wt (!) 141.1 kg (311 lb 1.1 oz)   SpO2 95%   Breastfeeding No   BMI 53.39 kg/m²     Physical Exam  Constitutional:       General: She is speaking full sentences.      Appearance: She is obese. She is not ill-appearing, toxic-appearing or diaphoretic.   HENT:      Head: Normocephalic and atraumatic.      Right Ear: External ear normal.      Left Ear: External ear normal.      Nose: Nose normal.      Mouth/Throat:      Mouth: Mucous membranes are moist.       Pharynx: Oropharynx is clear.   Eyes:      Extraocular Movements: Extraocular movements intact.      Pupils: Pupils are equal, round, and reactive to light.   Cardiovascular:      Rate and Rhythm: Regular rhythm. Normal rate.      Pulses: Normal pulses.      Heart sounds: Normal heart sounds.   Pulmonary:      Effort: no increased work of breathing      Breath sounds: No wheezing or rhonchi present. No rales.   Abdominal:      General: Bowel sounds are normal.      Palpations: Abdomen is soft.      Tenderness: There is no abdominal tenderness.   Musculoskeletal:         General: No tenderness. Normal range of motion.      Cervical back: Normal range of motion and neck supple.   Skin:     General: Skin is warm and dry.      Capillary Refill: Capillary refill takes less than 2 seconds.   Neurological:      General: No focal deficit present.      Mental Status: She is alert " and oriented to person, place, and time.   Psychiatric:         Mood and Affect: Mood normal.         Behavior: Behavior normal.         Thought Content: Thought content normal.         Judgment: Judgment normal.        Goals of Care Treatment Preferences:  Code Status: Full Code      Final Active Diagnoses:    Diagnosis Date Noted POA    PRINCIPAL PROBLEM:  Asthma exacerbation [J45.901] 03/05/2022 Unknown    Anxiety [F41.9] 03/05/2022 Yes    Acute bronchitis [J20.9] 03/05/2022 Yes    Asthma [J45.909] 03/05/2022 Unknown    Essential hypertension [I10] 03/05/2022 Yes    Morbid obesity with BMI of 50.0-59.9, adult [E66.01, Z68.43] 03/05/2022 Not Applicable    Type 2 diabetes mellitus, without long-term current use of insulin [E11.9] 03/05/2022 Yes    ALANIS (acute kidney injury) [N17.9] 03/05/2022 Yes      Problems Resolved During this Admission:       Discharged Condition: stable    Disposition: Home or Self Care    Follow Up:   Follow-up Information     Vicente Dao MD Follow up.    Specialty: General Practice  Contact information:  [address] [place] [address]   [id number]  [place] [address]  [phone]                       Patient Instructions:      Complete PFT W/O Bronchodilator and FeNO   Standing Status: Future Standing Exp. Date: 03/05/23     Order Specific Question Answer Comments   Release to patient Immediate        Significant Diagnostic Studies: Labs: All labs within the past 24 hours have been reviewed  Microbiology:   Sputum Culture   Lab Results   Component Value Date    GSRESP <10 epithelial cells per low power field. 03/05/2022    GSRESP Rare WBC's  03/05/2022    GSRESP Few Gram negative rods 03/05/2022    GSRESP Few Gram positive cocci 03/05/2022    RESPIRATORYC Normal respiratory anton 03/05/2022       Pending Diagnostic Studies:     Procedure Component Value Units Date/Time    HIV 1/2 Ag/Ab (4th Gen) [[id number]] Collected: 03/04/22 5226    Order Status: Sent Lab Status: In process Updated:  03/04/22 2359    Specimen: Blood     Hepatitis C Antibody [[id number]] Collected: 03/04/22 2359    Order Status: Sent Lab Status: In process Updated: 03/05/22 0012    Specimen: Blood          Medications:  Reconciled Home Medications:      Medication List      START taking these medications    benzonatate 100 MG capsule  Commonly known as: TESSALON  Take 1 capsule (100 mg total) by mouth 3 (three) times daily as needed for Cough.     predniSONE 20 MG tablet  Commonly known as: DELTASONE  Take 2 tablets (40 mg total) by mouth once daily. for 3 days        CONTINUE taking these medications    benazepril-hydrochlorthiazide 10-12.5 mg Tab  Commonly known as: LOTENSIN HCT  Take 1 tablet by mouth once daily.     carisoprodoL 350 MG tablet  Commonly known as: SOMA  Take 350 mg by mouth 4 (four) times daily as needed.     clonazePAM 1 MG tablet  Commonly known as: KlonoPIN  Take 1 mg by mouth 2 (two) times daily as needed for Anxiety.     gabapentin 300 MG capsule  Commonly known as: NEURONTIN  Take 1 capsule (300 mg total) by mouth 3 (three) times daily.     meloxicam 15 MG tablet  Commonly known as: MOBIC  Take 15 mg by mouth once daily.     naproxen 500 MG tablet  Commonly known as: NAPROSYN  Take 1 tablet (500 mg total) by mouth 2 (two) times daily with meals. Take with food     OZEMPIC SUBQ  Inject into the skin.     PROAIR HFA 90 mcg/actuation inhaler  Generic drug: albuterol     sertraline 50 MG tablet  Commonly known as: ZOLOFT  Take 100 mg by mouth once daily.            Indwelling Lines/Drains at time of discharge:   Lines/Drains/Airways     None                 Time spent on the discharge of patient: 35 minutes         Mayte Gilbert MD  Department of Hospital Medicine  [facility] - Cardiology Stepdown

## 2022-03-08 LAB
BACTERIA SPEC AEROBE CULT: ABNORMAL
BACTERIA SPEC AEROBE CULT: ABNORMAL
GRAM STN SPEC: ABNORMAL

## 2022-03-09 LAB — HIV 1+2 AB+HIV1 P24 AG SERPL QL IA: NEGATIVE

## 2022-03-10 LAB
BACTERIA BLD CULT: NORMAL
BACTERIA BLD CULT: NORMAL

## (undated) DEVICE — DRESSING N ADH OIL EMUL 3X8 3S

## (undated) DEVICE — SUT VICRYL CTD 2-0 GI 27 SH

## (undated) DEVICE — SYR ONLY LUER LOCK 20CC

## (undated) DEVICE — PAD ABD 8X10 STERILE

## (undated) DEVICE — GAUZE SPONGE 4X4 12PLY

## (undated) DEVICE — SPONGE GAUZE 16PLY 4X4

## (undated) DEVICE — NDL HYPO REG 25G X 1 1/2

## (undated) DEVICE — NDL 18GA X1 1/2 REG BEVEL

## (undated) DEVICE — BLADE MICRO REC SMALL CROSS

## (undated) DEVICE — SEE MEDLINE ITEM 152515

## (undated) DEVICE — PADDING CAST SPECIALIST 6X4YD

## (undated) DEVICE — SUT 4-0 VICRYL / SH

## (undated) DEVICE — SUT ETHIBOND EXCEL 2-0 30IN

## (undated) DEVICE — SUT 2-0 VICRYL / SH (J417)

## (undated) DEVICE — DRAPE STERI U-SHAPED 47X51IN

## (undated) DEVICE — BLADE SURG #15 CARBON STEEL

## (undated) DEVICE — TAPE CASTING 4IN BLUE

## (undated) DEVICE — SEE MEDLINE ITEM 157131

## (undated) DEVICE — BLADE SURG CARBON STEEL #10

## (undated) DEVICE — DRESSING N ADH OIL EMUL 3X3

## (undated) DEVICE — SPONGE LAP 18X18 PREWASHED

## (undated) DEVICE — TOURNIQUET SB QC DP 34X4IN

## (undated) DEVICE — APPLICATOR CHLORAPREP ORN 26ML

## (undated) DEVICE — SYR 10CC LUER LOCK

## (undated) DEVICE — BLADE SURG CARBON STEEL SZ11

## (undated) DEVICE — SEE MEDLINE ITEM 146298

## (undated) DEVICE — SUT VICRYL 3-0 27 SH

## (undated) DEVICE — SEE MEDLINE ITEM 152622

## (undated) DEVICE — DRAPE C ARM 42 X 120 10/BX

## (undated) DEVICE — SUT ETHILON 3-0 PS2 18 BLK

## (undated) DEVICE — SEE MEDLINE ITEM 157128

## (undated) DEVICE — PAD K-THERMIA 24IN X 60IN

## (undated) DEVICE — PILLOW HEAD REST

## (undated) DEVICE — DRESSING N ADH OIL EMUL 3X8

## (undated) DEVICE — SUT ETHILON 3/0 18IN PS-1

## (undated) DEVICE — BLADE SAGITTA 5/BX

## (undated) DEVICE — STOCKINET 4INX48

## (undated) DEVICE — SUT ETHILON 3-0

## (undated) DEVICE — BANDAGE ESMARK 6X12

## (undated) DEVICE — SUT ETHILON 4-0 PS2 18 BLK

## (undated) DEVICE — POSITIONER IV ARMBOARD FOAM

## (undated) DEVICE — ELECTRODE REM PLYHSV RETURN 9

## (undated) DEVICE — TRAY MINOR ORTHO

## (undated) DEVICE — SPONGE DERMACEA GAUZE 4X4

## (undated) DEVICE — SUT CTD VICRYL 0 UND BR CT

## (undated) DEVICE — MARKER SKIN STND TIP BLUE BARR

## (undated) DEVICE — SUT CTD VICRYL 0 UND BR

## (undated) DEVICE — STOCKINET TUBULAR 1 PLY 6X60IN

## (undated) DEVICE — PAD CAST SPECIALIST STRL 4